# Patient Record
Sex: FEMALE | Race: WHITE | Employment: OTHER
[De-identification: names, ages, dates, MRNs, and addresses within clinical notes are randomized per-mention and may not be internally consistent; named-entity substitution may affect disease eponyms.]

---

## 2018-01-01 ENCOUNTER — HOME CARE VISIT (OUTPATIENT)
Dept: SCHEDULING | Facility: HOME HEALTH | Age: 78
End: 2018-01-01
Payer: MEDICARE

## 2018-01-01 ENCOUNTER — ANESTHESIA EVENT (OUTPATIENT)
Dept: SURGERY | Age: 78
DRG: 481 | End: 2018-01-01
Payer: MEDICARE

## 2018-01-01 ENCOUNTER — APPOINTMENT (OUTPATIENT)
Dept: GENERAL RADIOLOGY | Age: 78
DRG: 481 | End: 2018-01-01
Attending: EMERGENCY MEDICINE
Payer: MEDICARE

## 2018-01-01 ENCOUNTER — ANESTHESIA (OUTPATIENT)
Dept: SURGERY | Age: 78
DRG: 481 | End: 2018-01-01
Payer: MEDICARE

## 2018-01-01 ENCOUNTER — HOME HEALTH ADMISSION (OUTPATIENT)
Dept: HOME HEALTH SERVICES | Facility: HOME HEALTH | Age: 78
End: 2018-01-01
Payer: MEDICARE

## 2018-01-01 ENCOUNTER — HOSPITAL ENCOUNTER (INPATIENT)
Age: 78
LOS: 3 days | Discharge: NURSING, REHAB OR LONG-TERM CARE | DRG: 481 | End: 2018-06-07
Attending: EMERGENCY MEDICINE | Admitting: INTERNAL MEDICINE
Payer: MEDICARE

## 2018-01-01 ENCOUNTER — APPOINTMENT (OUTPATIENT)
Dept: GENERAL RADIOLOGY | Age: 78
DRG: 481 | End: 2018-01-01
Attending: ORTHOPAEDIC SURGERY
Payer: MEDICARE

## 2018-01-01 VITALS
TEMPERATURE: 98.2 F | OXYGEN SATURATION: 99 % | BODY MASS INDEX: 15.77 KG/M2 | RESPIRATION RATE: 18 BRPM | HEIGHT: 63 IN | DIASTOLIC BLOOD PRESSURE: 62 MMHG | SYSTOLIC BLOOD PRESSURE: 103 MMHG | WEIGHT: 89 LBS | HEART RATE: 73 BPM

## 2018-01-01 VITALS
TEMPERATURE: 98.4 F | SYSTOLIC BLOOD PRESSURE: 108 MMHG | RESPIRATION RATE: 18 BRPM | HEART RATE: 82 BPM | DIASTOLIC BLOOD PRESSURE: 64 MMHG

## 2018-01-01 VITALS
HEART RATE: 80 BPM | DIASTOLIC BLOOD PRESSURE: 80 MMHG | BODY MASS INDEX: 16.56 KG/M2 | WEIGHT: 90 LBS | TEMPERATURE: 98.9 F | HEIGHT: 62 IN | RESPIRATION RATE: 14 BRPM | SYSTOLIC BLOOD PRESSURE: 130 MMHG

## 2018-01-01 VITALS
DIASTOLIC BLOOD PRESSURE: 62 MMHG | TEMPERATURE: 97.5 F | SYSTOLIC BLOOD PRESSURE: 120 MMHG | HEART RATE: 66 BPM | RESPIRATION RATE: 18 BRPM

## 2018-01-01 DIAGNOSIS — G89.29 CHRONIC BILATERAL LOW BACK PAIN WITHOUT SCIATICA: ICD-10-CM

## 2018-01-01 DIAGNOSIS — S72.001A CLOSED FRACTURE OF RIGHT HIP, INITIAL ENCOUNTER (HCC): Primary | ICD-10-CM

## 2018-01-01 DIAGNOSIS — M54.50 CHRONIC BILATERAL LOW BACK PAIN WITHOUT SCIATICA: ICD-10-CM

## 2018-01-01 DIAGNOSIS — R60.0 LOCALIZED EDEMA: ICD-10-CM

## 2018-01-01 LAB
25(OH)D3+25(OH)D2 SERPL-MCNC: 16.8 NG/ML (ref 30–100)
ABO + RH BLD: NORMAL
ALBUMIN SERPL-MCNC: 2.6 G/DL (ref 3.2–4.6)
ALBUMIN/GLOB SERPL: 0.8 {RATIO} (ref 1.2–3.5)
ALP SERPL-CCNC: 50 U/L (ref 50–136)
ALT SERPL-CCNC: 12 U/L (ref 12–65)
ANION GAP SERPL CALC-SCNC: 7 MMOL/L (ref 7–16)
ANION GAP SERPL CALC-SCNC: 8 MMOL/L (ref 7–16)
APPEARANCE UR: CLEAR
APTT PPP: 27 SEC (ref 23.2–35.3)
AST SERPL-CCNC: 16 U/L (ref 15–37)
ATRIAL RATE: 99 BPM
BACTERIA SPEC CULT: NORMAL
BACTERIA URNS QL MICRO: 0 /HPF
BASOPHILS # BLD: 0 K/UL (ref 0–0.2)
BASOPHILS NFR BLD: 0 % (ref 0–2)
BILIRUB SERPL-MCNC: 0.5 MG/DL (ref 0.2–1.1)
BILIRUB UR QL: NEGATIVE
BLOOD GROUP ANTIBODIES SERPL: NORMAL
BUN SERPL-MCNC: 14 MG/DL (ref 8–23)
BUN SERPL-MCNC: 16 MG/DL (ref 8–23)
CALCIUM SERPL-MCNC: 8 MG/DL (ref 8.3–10.4)
CALCIUM SERPL-MCNC: 8.2 MG/DL (ref 8.3–10.4)
CALCIUM SERPL-MCNC: 8.5 MG/DL (ref 8.3–10.4)
CALCULATED R AXIS, ECG10: -69 DEGREES
CALCULATED T AXIS, ECG11: 99 DEGREES
CHLORIDE SERPL-SCNC: 113 MMOL/L (ref 98–107)
CHLORIDE SERPL-SCNC: 116 MMOL/L (ref 98–107)
CO2 SERPL-SCNC: 22 MMOL/L (ref 21–32)
CO2 SERPL-SCNC: 23 MMOL/L (ref 21–32)
COLOR UR: YELLOW
CREAT SERPL-MCNC: 0.88 MG/DL (ref 0.6–1)
CREAT SERPL-MCNC: 0.94 MG/DL (ref 0.6–1)
DIAGNOSIS, 93000: NORMAL
DIFFERENTIAL METHOD BLD: ABNORMAL
EOSINOPHIL # BLD: 0 K/UL (ref 0–0.8)
EOSINOPHIL # BLD: 0.1 K/UL (ref 0–0.8)
EOSINOPHIL # BLD: 0.3 K/UL (ref 0–0.8)
EOSINOPHIL NFR BLD: 0 % (ref 0.5–7.8)
EOSINOPHIL NFR BLD: 2 % (ref 0.5–7.8)
EOSINOPHIL NFR BLD: 5 % (ref 0.5–7.8)
EPI CELLS #/AREA URNS HPF: ABNORMAL /HPF
ERYTHROCYTE [DISTWIDTH] IN BLOOD BY AUTOMATED COUNT: 15.6 % (ref 11.9–14.6)
ERYTHROCYTE [DISTWIDTH] IN BLOOD BY AUTOMATED COUNT: 15.8 % (ref 11.9–14.6)
ERYTHROCYTE [DISTWIDTH] IN BLOOD BY AUTOMATED COUNT: 16 % (ref 11.9–14.6)
ERYTHROCYTE [DISTWIDTH] IN BLOOD BY AUTOMATED COUNT: 16 % (ref 11.9–14.6)
GLOBULIN SER CALC-MCNC: 3.3 G/DL (ref 2.3–3.5)
GLUCOSE SERPL-MCNC: 118 MG/DL (ref 65–100)
GLUCOSE SERPL-MCNC: 72 MG/DL (ref 65–100)
GLUCOSE UR STRIP.AUTO-MCNC: NEGATIVE MG/DL
HCT VFR BLD AUTO: 31 % (ref 35.8–46.3)
HCT VFR BLD AUTO: 32.8 % (ref 35.8–46.3)
HCT VFR BLD AUTO: 37.8 % (ref 35.8–46.3)
HCT VFR BLD AUTO: 38.2 % (ref 35.8–46.3)
HGB BLD-MCNC: 10.5 G/DL (ref 11.7–15.4)
HGB BLD-MCNC: 12.4 G/DL (ref 11.7–15.4)
HGB BLD-MCNC: 12.6 G/DL (ref 11.7–15.4)
HGB BLD-MCNC: 9.8 G/DL (ref 11.7–15.4)
HGB UR QL STRIP: NEGATIVE
IMM GRANULOCYTES # BLD: 0 K/UL (ref 0–0.5)
IMM GRANULOCYTES NFR BLD AUTO: 0 % (ref 0–5)
INR PPP: 1.1
INR PPP: 1.1
KETONES UR QL STRIP.AUTO: NEGATIVE MG/DL
LEUKOCYTE ESTERASE UR QL STRIP.AUTO: ABNORMAL
LYMPHOCYTES # BLD: 1.2 K/UL (ref 0.5–4.6)
LYMPHOCYTES # BLD: 1.3 K/UL (ref 0.5–4.6)
LYMPHOCYTES # BLD: 1.6 K/UL (ref 0.5–4.6)
LYMPHOCYTES NFR BLD: 17 % (ref 13–44)
LYMPHOCYTES NFR BLD: 18 % (ref 13–44)
LYMPHOCYTES NFR BLD: 20 % (ref 13–44)
MAGNESIUM SERPL-MCNC: 1.7 MG/DL (ref 1.8–2.4)
MCH RBC QN AUTO: 30.8 PG (ref 26.1–32.9)
MCH RBC QN AUTO: 31.3 PG (ref 26.1–32.9)
MCH RBC QN AUTO: 32 PG (ref 26.1–32.9)
MCH RBC QN AUTO: 32.4 PG (ref 26.1–32.9)
MCHC RBC AUTO-ENTMCNC: 31.6 G/DL (ref 31.4–35)
MCHC RBC AUTO-ENTMCNC: 32 G/DL (ref 31.4–35)
MCHC RBC AUTO-ENTMCNC: 32.5 G/DL (ref 31.4–35)
MCHC RBC AUTO-ENTMCNC: 33.3 G/DL (ref 31.4–35)
MCV RBC AUTO: 97.2 FL (ref 79.6–97.8)
MCV RBC AUTO: 97.5 FL (ref 79.6–97.8)
MCV RBC AUTO: 97.6 FL (ref 79.6–97.8)
MCV RBC AUTO: 98.5 FL (ref 79.6–97.8)
MM INDURATION POC: 0 MM (ref 0–5)
MM INDURATION POC: NORMAL 0 (ref 0–5)
MM INDURATION POC: NORMAL MM (ref 0–5)
MONOCYTES # BLD: 0.7 K/UL (ref 0.1–1.3)
MONOCYTES # BLD: 0.8 K/UL (ref 0.1–1.3)
MONOCYTES # BLD: 0.9 K/UL (ref 0.1–1.3)
MONOCYTES NFR BLD: 11 % (ref 4–12)
NEUTS SEG # BLD: 4.5 K/UL (ref 1.7–8.2)
NEUTS SEG # BLD: 5.3 K/UL (ref 1.7–8.2)
NEUTS SEG # BLD: 5.4 K/UL (ref 1.7–8.2)
NEUTS SEG NFR BLD: 66 % (ref 43–78)
NEUTS SEG NFR BLD: 69 % (ref 43–78)
NEUTS SEG NFR BLD: 70 % (ref 43–78)
NITRITE UR QL STRIP.AUTO: NEGATIVE
OTHER OBSERVATIONS,UCOM: ABNORMAL
PH UR STRIP: 6.5 [PH] (ref 5–9)
PLATELET # BLD AUTO: 122 K/UL (ref 150–450)
PLATELET # BLD AUTO: 130 K/UL (ref 150–450)
PLATELET # BLD AUTO: 131 K/UL (ref 150–450)
PLATELET # BLD AUTO: 138 K/UL (ref 150–450)
PMV BLD AUTO: 10 FL (ref 10.8–14.1)
PMV BLD AUTO: 10 FL (ref 10.8–14.1)
PMV BLD AUTO: 9.7 FL (ref 10.8–14.1)
PMV BLD AUTO: 9.9 FL (ref 10.8–14.1)
POTASSIUM SERPL-SCNC: 3.2 MMOL/L (ref 3.5–5.1)
POTASSIUM SERPL-SCNC: 4.1 MMOL/L (ref 3.5–5.1)
PPD POC: NORMAL NEGATIVE
PPD POC: NORMAL NEGATIVE
PREALB SERPL-MCNC: 11.8 MG/DL (ref 18–35.7)
PROT SERPL-MCNC: 5.9 G/DL (ref 6.3–8.2)
PROT UR STRIP-MCNC: 30 MG/DL
PROTHROMBIN TIME: 13.5 SEC (ref 11.5–14.5)
PROTHROMBIN TIME: 13.9 SEC (ref 11.5–14.5)
PTH-INTACT SERPL-MCNC: 32.5 PG/ML (ref 18.5–88)
Q-T INTERVAL, ECG07: 436 MS
QRS DURATION, ECG06: 184 MS
QTC CALCULATION (BEZET), ECG08: 497 MS
RBC # BLD AUTO: 3.18 M/UL (ref 4.05–5.25)
RBC # BLD AUTO: 3.36 M/UL (ref 4.05–5.25)
RBC # BLD AUTO: 3.88 M/UL (ref 4.05–5.25)
RBC # BLD AUTO: 3.89 M/UL (ref 4.05–5.25)
RBC #/AREA URNS HPF: ABNORMAL /HPF
SERVICE CMNT-IMP: NORMAL
SODIUM SERPL-SCNC: 143 MMOL/L (ref 136–145)
SODIUM SERPL-SCNC: 146 MMOL/L (ref 136–145)
SP GR UR REFRACTOMETRY: 1.02 (ref 1–1.02)
SPECIMEN EXP DATE BLD: NORMAL
UROBILINOGEN UR QL STRIP.AUTO: 1 EU/DL (ref 0.2–1)
VENTRICULAR RATE, ECG03: 78 BPM
WBC # BLD AUTO: 6.9 K/UL (ref 4.3–11.1)
WBC # BLD AUTO: 7.5 K/UL (ref 4.3–11.1)
WBC # BLD AUTO: 8 K/UL (ref 4.3–11.1)
WBC # BLD AUTO: 9.2 K/UL (ref 4.3–11.1)
WBC URNS QL MICRO: ABNORMAL /HPF

## 2018-01-01 PROCEDURE — G0151 HHCP-SERV OF PT,EA 15 MIN: HCPCS

## 2018-01-01 PROCEDURE — 74011250636 HC RX REV CODE- 250/636: Performed by: EMERGENCY MEDICINE

## 2018-01-01 PROCEDURE — 74011250637 HC RX REV CODE- 250/637: Performed by: INTERNAL MEDICINE

## 2018-01-01 PROCEDURE — 65270000029 HC RM PRIVATE

## 2018-01-01 PROCEDURE — 85610 PROTHROMBIN TIME: CPT | Performed by: EMERGENCY MEDICINE

## 2018-01-01 PROCEDURE — 97530 THERAPEUTIC ACTIVITIES: CPT

## 2018-01-01 PROCEDURE — 74011250637 HC RX REV CODE- 250/637: Performed by: NURSE PRACTITIONER

## 2018-01-01 PROCEDURE — 77030020255 HC SOL INJ LR 1000ML BG

## 2018-01-01 PROCEDURE — 83970 ASSAY OF PARATHORMONE: CPT | Performed by: NURSE PRACTITIONER

## 2018-01-01 PROCEDURE — 77030008467 HC STPLR SKN COVD -B: Performed by: ORTHOPAEDIC SURGERY

## 2018-01-01 PROCEDURE — 74011000302 HC RX REV CODE- 302: Performed by: INTERNAL MEDICINE

## 2018-01-01 PROCEDURE — 80048 BASIC METABOLIC PNL TOTAL CA: CPT | Performed by: INTERNAL MEDICINE

## 2018-01-01 PROCEDURE — 77030032490 HC SLV COMPR SCD KNE COVD -B

## 2018-01-01 PROCEDURE — 77030018836 HC SOL IRR NACL ICUM -A: Performed by: ORTHOPAEDIC SURGERY

## 2018-01-01 PROCEDURE — 73552 X-RAY EXAM OF FEMUR 2/>: CPT

## 2018-01-01 PROCEDURE — 36415 COLL VENOUS BLD VENIPUNCTURE: CPT | Performed by: INTERNAL MEDICINE

## 2018-01-01 PROCEDURE — 0QS636Z REPOSITION RIGHT UPPER FEMUR WITH INTRAMEDULLARY INTERNAL FIXATION DEVICE, PERCUTANEOUS APPROACH: ICD-10-PCS | Performed by: ORTHOPAEDIC SURGERY

## 2018-01-01 PROCEDURE — 86900 BLOOD TYPING SEROLOGIC ABO: CPT | Performed by: EMERGENCY MEDICINE

## 2018-01-01 PROCEDURE — 94762 N-INVAS EAR/PLS OXIMTRY CONT: CPT

## 2018-01-01 PROCEDURE — 0T9B70Z DRAINAGE OF BLADDER WITH DRAINAGE DEVICE, VIA NATURAL OR ARTIFICIAL OPENING: ICD-10-PCS | Performed by: EMERGENCY MEDICINE

## 2018-01-01 PROCEDURE — 74011250636 HC RX REV CODE- 250/636: Performed by: NURSE PRACTITIONER

## 2018-01-01 PROCEDURE — 74011250636 HC RX REV CODE- 250/636: Performed by: INTERNAL MEDICINE

## 2018-01-01 PROCEDURE — 51702 INSERT TEMP BLADDER CATH: CPT | Performed by: EMERGENCY MEDICINE

## 2018-01-01 PROCEDURE — 87641 MR-STAPH DNA AMP PROBE: CPT | Performed by: NURSE PRACTITIONER

## 2018-01-01 PROCEDURE — 77030036696 HC BOOT TRACT BUCKS S2SG -A

## 2018-01-01 PROCEDURE — 77030008477 HC STYL SATN SLP COVD -A: Performed by: REGISTERED NURSE

## 2018-01-01 PROCEDURE — 96374 THER/PROPH/DIAG INJ IV PUSH: CPT | Performed by: EMERGENCY MEDICINE

## 2018-01-01 PROCEDURE — 80053 COMPREHEN METABOLIC PANEL: CPT | Performed by: EMERGENCY MEDICINE

## 2018-01-01 PROCEDURE — C8929 TTE W OR WO FOL WCON,DOPPLER: HCPCS

## 2018-01-01 PROCEDURE — 76210000006 HC OR PH I REC 0.5 TO 1 HR: Performed by: ORTHOPAEDIC SURGERY

## 2018-01-01 PROCEDURE — 85730 THROMBOPLASTIN TIME PARTIAL: CPT | Performed by: EMERGENCY MEDICINE

## 2018-01-01 PROCEDURE — 85025 COMPLETE CBC W/AUTO DIFF WBC: CPT | Performed by: NURSE PRACTITIONER

## 2018-01-01 PROCEDURE — 74011250636 HC RX REV CODE- 250/636

## 2018-01-01 PROCEDURE — 71045 X-RAY EXAM CHEST 1 VIEW: CPT

## 2018-01-01 PROCEDURE — 85025 COMPLETE CBC W/AUTO DIFF WBC: CPT | Performed by: INTERNAL MEDICINE

## 2018-01-01 PROCEDURE — C1769 GUIDE WIRE: HCPCS | Performed by: ORTHOPAEDIC SURGERY

## 2018-01-01 PROCEDURE — 74011000250 HC RX REV CODE- 250

## 2018-01-01 PROCEDURE — 77030035168: Performed by: ORTHOPAEDIC SURGERY

## 2018-01-01 PROCEDURE — 77030002933 HC SUT MCRYL J&J -A: Performed by: ORTHOPAEDIC SURGERY

## 2018-01-01 PROCEDURE — 77030020263 HC SOL INJ SOD CL0.9% LFCR 1000ML

## 2018-01-01 PROCEDURE — 99285 EMERGENCY DEPT VISIT HI MDM: CPT | Performed by: EMERGENCY MEDICINE

## 2018-01-01 PROCEDURE — 97110 THERAPEUTIC EXERCISES: CPT

## 2018-01-01 PROCEDURE — 84134 ASSAY OF PREALBUMIN: CPT | Performed by: NURSE PRACTITIONER

## 2018-01-01 PROCEDURE — 85027 COMPLETE CBC AUTOMATED: CPT | Performed by: EMERGENCY MEDICINE

## 2018-01-01 PROCEDURE — 77030005515 HC CATH URETH FOL14 BARD -B

## 2018-01-01 PROCEDURE — C1713 ANCHOR/SCREW BN/BN,TIS/BN: HCPCS | Performed by: ORTHOPAEDIC SURGERY

## 2018-01-01 PROCEDURE — 400013 HH SOC

## 2018-01-01 PROCEDURE — G0152 HHCP-SERV OF OT,EA 15 MIN: HCPCS

## 2018-01-01 PROCEDURE — 36415 COLL VENOUS BLD VENIPUNCTURE: CPT | Performed by: NURSE PRACTITIONER

## 2018-01-01 PROCEDURE — 97162 PT EVAL MOD COMPLEX 30 MIN: CPT

## 2018-01-01 PROCEDURE — 97166 OT EVAL MOD COMPLEX 45 MIN: CPT

## 2018-01-01 PROCEDURE — 77030008703 HC TU ET UNCUF COVD -A: Performed by: REGISTERED NURSE

## 2018-01-01 PROCEDURE — 93005 ELECTROCARDIOGRAM TRACING: CPT | Performed by: EMERGENCY MEDICINE

## 2018-01-01 PROCEDURE — 77030020782 HC GWN BAIR PAWS FLX 3M -B: Performed by: ANESTHESIOLOGY

## 2018-01-01 PROCEDURE — 76060000033 HC ANESTHESIA 1 TO 1.5 HR: Performed by: ORTHOPAEDIC SURGERY

## 2018-01-01 PROCEDURE — 74011000258 HC RX REV CODE- 258: Performed by: NURSE PRACTITIONER

## 2018-01-01 PROCEDURE — 77030014405 HC GD ROD RMR SYNT -C: Performed by: ORTHOPAEDIC SURGERY

## 2018-01-01 PROCEDURE — 73110 X-RAY EXAM OF WRIST: CPT

## 2018-01-01 PROCEDURE — 77030033269 HC SLV COMPR SCD KNE2 CARD -B

## 2018-01-01 PROCEDURE — 74011250636 HC RX REV CODE- 250/636: Performed by: ANESTHESIOLOGY

## 2018-01-01 PROCEDURE — 97535 SELF CARE MNGMENT TRAINING: CPT

## 2018-01-01 PROCEDURE — 51702 INSERT TEMP BLADDER CATH: CPT

## 2018-01-01 PROCEDURE — 94762 N-INVAS EAR/PLS OXIMTRY CONT: CPT | Performed by: EMERGENCY MEDICINE

## 2018-01-01 PROCEDURE — 73502 X-RAY EXAM HIP UNI 2-3 VIEWS: CPT

## 2018-01-01 PROCEDURE — 77010033678 HC OXYGEN DAILY

## 2018-01-01 PROCEDURE — 81001 URINALYSIS AUTO W/SCOPE: CPT | Performed by: NURSE PRACTITIONER

## 2018-01-01 PROCEDURE — 82306 VITAMIN D 25 HYDROXY: CPT | Performed by: NURSE PRACTITIONER

## 2018-01-01 PROCEDURE — 77030011640 HC PAD GRND REM COVD -A: Performed by: ORTHOPAEDIC SURGERY

## 2018-01-01 PROCEDURE — 74011000250 HC RX REV CODE- 250: Performed by: INTERNAL MEDICINE

## 2018-01-01 PROCEDURE — 86580 TB INTRADERMAL TEST: CPT | Performed by: INTERNAL MEDICINE

## 2018-01-01 PROCEDURE — 76010000160 HC OR TIME 0.5 TO 1 HR INTENSV-TIER 1: Performed by: ORTHOPAEDIC SURGERY

## 2018-01-01 PROCEDURE — 83735 ASSAY OF MAGNESIUM: CPT | Performed by: INTERNAL MEDICINE

## 2018-01-01 DEVICE — NAIL IM L400MM DIA12MM 130DEG LNG R PROX FEM GRN TI CANN: Type: IMPLANTABLE DEVICE | Site: HIP | Status: FUNCTIONAL

## 2018-01-01 DEVICE — IMPLANTABLE DEVICE: Type: IMPLANTABLE DEVICE | Site: HIP | Status: FUNCTIONAL

## 2018-01-01 RX ORDER — NALOXONE HYDROCHLORIDE 0.4 MG/ML
0.2 INJECTION, SOLUTION INTRAMUSCULAR; INTRAVENOUS; SUBCUTANEOUS AS NEEDED
Status: DISCONTINUED | OUTPATIENT
Start: 2018-01-01 | End: 2018-01-01 | Stop reason: HOSPADM

## 2018-01-01 RX ORDER — CEFAZOLIN SODIUM/WATER 2 G/20 ML
2 SYRINGE (ML) INTRAVENOUS
Status: COMPLETED | OUTPATIENT
Start: 2018-01-01 | End: 2018-01-01

## 2018-01-01 RX ORDER — ENOXAPARIN SODIUM 100 MG/ML
30 INJECTION SUBCUTANEOUS EVERY 24 HOURS
Status: DISCONTINUED | OUTPATIENT
Start: 2018-01-01 | End: 2018-01-01 | Stop reason: HOSPADM

## 2018-01-01 RX ORDER — NEOSTIGMINE METHYLSULFATE 1 MG/ML
INJECTION INTRAVENOUS AS NEEDED
Status: DISCONTINUED | OUTPATIENT
Start: 2018-01-01 | End: 2018-01-01 | Stop reason: HOSPADM

## 2018-01-01 RX ORDER — SODIUM CHLORIDE, SODIUM LACTATE, POTASSIUM CHLORIDE, CALCIUM CHLORIDE 600; 310; 30; 20 MG/100ML; MG/100ML; MG/100ML; MG/100ML
75 INJECTION, SOLUTION INTRAVENOUS
Status: DISCONTINUED | OUTPATIENT
Start: 2018-01-01 | End: 2018-01-01

## 2018-01-01 RX ORDER — OXYCODONE HYDROCHLORIDE 5 MG/1
5 TABLET ORAL
Status: DISCONTINUED | OUTPATIENT
Start: 2018-01-01 | End: 2018-01-01 | Stop reason: SDUPTHER

## 2018-01-01 RX ORDER — GLYCOPYRROLATE 0.2 MG/ML
INJECTION INTRAMUSCULAR; INTRAVENOUS AS NEEDED
Status: DISCONTINUED | OUTPATIENT
Start: 2018-01-01 | End: 2018-01-01 | Stop reason: HOSPADM

## 2018-01-01 RX ORDER — FENTANYL CITRATE 50 UG/ML
100 INJECTION, SOLUTION INTRAMUSCULAR; INTRAVENOUS ONCE
Status: DISCONTINUED | OUTPATIENT
Start: 2018-01-01 | End: 2018-01-01 | Stop reason: HOSPADM

## 2018-01-01 RX ORDER — ACETAMINOPHEN 325 MG/1
650 TABLET ORAL EVERY 8 HOURS
Status: DISCONTINUED | OUTPATIENT
Start: 2018-01-01 | End: 2018-01-01 | Stop reason: HOSPADM

## 2018-01-01 RX ORDER — LIDOCAINE HYDROCHLORIDE 20 MG/ML
INJECTION, SOLUTION EPIDURAL; INFILTRATION; INTRACAUDAL; PERINEURAL AS NEEDED
Status: DISCONTINUED | OUTPATIENT
Start: 2018-01-01 | End: 2018-01-01 | Stop reason: HOSPADM

## 2018-01-01 RX ORDER — ONDANSETRON 2 MG/ML
4 INJECTION INTRAMUSCULAR; INTRAVENOUS
Status: DISCONTINUED | OUTPATIENT
Start: 2018-01-01 | End: 2018-01-01 | Stop reason: HOSPADM

## 2018-01-01 RX ORDER — FENTANYL CITRATE 50 UG/ML
INJECTION, SOLUTION INTRAMUSCULAR; INTRAVENOUS AS NEEDED
Status: DISCONTINUED | OUTPATIENT
Start: 2018-01-01 | End: 2018-01-01 | Stop reason: HOSPADM

## 2018-01-01 RX ORDER — SODIUM CHLORIDE, SODIUM LACTATE, POTASSIUM CHLORIDE, CALCIUM CHLORIDE 600; 310; 30; 20 MG/100ML; MG/100ML; MG/100ML; MG/100ML
75 INJECTION, SOLUTION INTRAVENOUS CONTINUOUS
Status: DISCONTINUED | OUTPATIENT
Start: 2018-01-01 | End: 2018-01-01 | Stop reason: HOSPADM

## 2018-01-01 RX ORDER — ACETAMINOPHEN 325 MG/1
650 TABLET ORAL EVERY 8 HOURS
Status: DISCONTINUED | OUTPATIENT
Start: 2018-01-01 | End: 2018-01-01 | Stop reason: SDUPTHER

## 2018-01-01 RX ORDER — OXYCODONE HYDROCHLORIDE 5 MG/1
5 TABLET ORAL
Qty: 15 TAB | Refills: 0 | Status: SHIPPED | OUTPATIENT
Start: 2018-01-01

## 2018-01-01 RX ORDER — SODIUM CHLORIDE 0.9 % (FLUSH) 0.9 %
5-10 SYRINGE (ML) INJECTION AS NEEDED
Status: DISCONTINUED | OUTPATIENT
Start: 2018-01-01 | End: 2018-01-01 | Stop reason: HOSPADM

## 2018-01-01 RX ORDER — CLOPIDOGREL BISULFATE 75 MG/1
75 TABLET ORAL DAILY
COMMUNITY

## 2018-01-01 RX ORDER — SODIUM CHLORIDE 9 MG/ML
100 INJECTION, SOLUTION INTRAVENOUS CONTINUOUS
Status: DISCONTINUED | OUTPATIENT
Start: 2018-01-01 | End: 2018-01-01 | Stop reason: HOSPADM

## 2018-01-01 RX ORDER — METOPROLOL SUCCINATE 25 MG/1
25 TABLET, EXTENDED RELEASE ORAL DAILY
COMMUNITY

## 2018-01-01 RX ORDER — OXYCODONE HYDROCHLORIDE 5 MG/1
5 TABLET ORAL
Status: DISCONTINUED | OUTPATIENT
Start: 2018-01-01 | End: 2018-01-01 | Stop reason: HOSPADM

## 2018-01-01 RX ORDER — MIDAZOLAM HYDROCHLORIDE 1 MG/ML
2 INJECTION, SOLUTION INTRAMUSCULAR; INTRAVENOUS ONCE
Status: DISCONTINUED | OUTPATIENT
Start: 2018-01-01 | End: 2018-01-01 | Stop reason: HOSPADM

## 2018-01-01 RX ORDER — LIDOCAINE HYDROCHLORIDE 10 MG/ML
0.1 INJECTION INFILTRATION; PERINEURAL AS NEEDED
Status: DISCONTINUED | OUTPATIENT
Start: 2018-01-01 | End: 2018-01-01 | Stop reason: HOSPADM

## 2018-01-01 RX ORDER — DOCUSATE SODIUM 100 MG/1
100 CAPSULE, LIQUID FILLED ORAL 2 TIMES DAILY
Status: DISCONTINUED | OUTPATIENT
Start: 2018-01-01 | End: 2018-01-01 | Stop reason: HOSPADM

## 2018-01-01 RX ORDER — ENOXAPARIN SODIUM 100 MG/ML
30 INJECTION SUBCUTANEOUS EVERY 24 HOURS
Qty: 8.7 ML | Refills: 0 | Status: SHIPPED
Start: 2018-01-01 | End: 2018-01-01

## 2018-01-01 RX ORDER — SODIUM CHLORIDE 9 MG/ML
2000 INJECTION, SOLUTION INTRAVENOUS CONTINUOUS
Status: DISCONTINUED | OUTPATIENT
Start: 2018-01-01 | End: 2018-01-01

## 2018-01-01 RX ORDER — HYDROMORPHONE HYDROCHLORIDE 1 MG/ML
0.5 INJECTION, SOLUTION INTRAMUSCULAR; INTRAVENOUS; SUBCUTANEOUS
Status: DISCONTINUED | OUTPATIENT
Start: 2018-01-01 | End: 2018-01-01

## 2018-01-01 RX ORDER — HYDROMORPHONE HYDROCHLORIDE 2 MG/ML
0.5 INJECTION, SOLUTION INTRAMUSCULAR; INTRAVENOUS; SUBCUTANEOUS
Status: DISCONTINUED | OUTPATIENT
Start: 2018-01-01 | End: 2018-01-01 | Stop reason: HOSPADM

## 2018-01-01 RX ORDER — FENTANYL 75 UG/H
1 PATCH TRANSDERMAL
Status: DISCONTINUED | OUTPATIENT
Start: 2018-01-01 | End: 2018-01-01 | Stop reason: HOSPADM

## 2018-01-01 RX ORDER — MAG HYDROX/ALUMINUM HYD/SIMETH 200-200-20
30 SUSPENSION, ORAL (FINAL DOSE FORM) ORAL
Status: DISCONTINUED | OUTPATIENT
Start: 2018-01-01 | End: 2018-01-01 | Stop reason: HOSPADM

## 2018-01-01 RX ORDER — ROCURONIUM BROMIDE 10 MG/ML
INJECTION, SOLUTION INTRAVENOUS AS NEEDED
Status: DISCONTINUED | OUTPATIENT
Start: 2018-01-01 | End: 2018-01-01 | Stop reason: HOSPADM

## 2018-01-01 RX ORDER — SODIUM CHLORIDE 0.9 % (FLUSH) 0.9 %
5-10 SYRINGE (ML) INJECTION EVERY 8 HOURS
Status: DISCONTINUED | OUTPATIENT
Start: 2018-01-01 | End: 2018-01-01 | Stop reason: HOSPADM

## 2018-01-01 RX ORDER — GUAIFENESIN 600 MG/1
600 TABLET, EXTENDED RELEASE ORAL AS NEEDED
COMMUNITY
End: 2018-01-01

## 2018-01-01 RX ORDER — POTASSIUM CHLORIDE 20 MEQ/1
40 TABLET, EXTENDED RELEASE ORAL
Status: COMPLETED | OUTPATIENT
Start: 2018-01-01 | End: 2018-01-01

## 2018-01-01 RX ORDER — TRAMADOL HYDROCHLORIDE 50 MG/1
50 TABLET ORAL
Status: DISCONTINUED | OUTPATIENT
Start: 2018-01-01 | End: 2018-01-01 | Stop reason: HOSPADM

## 2018-01-01 RX ORDER — IPRATROPIUM BROMIDE AND ALBUTEROL SULFATE 2.5; .5 MG/3ML; MG/3ML
3 SOLUTION RESPIRATORY (INHALATION)
Status: DISCONTINUED | OUTPATIENT
Start: 2018-01-01 | End: 2018-01-01 | Stop reason: HOSPADM

## 2018-01-01 RX ORDER — FENTANYL 75 UG/H
1 PATCH TRANSDERMAL
Qty: 10 PATCH | Refills: 0 | Status: SHIPPED | OUTPATIENT
Start: 2018-01-01

## 2018-01-01 RX ORDER — PANTOPRAZOLE SODIUM 40 MG/1
40 TABLET, DELAYED RELEASE ORAL
Status: DISCONTINUED | OUTPATIENT
Start: 2018-01-01 | End: 2018-01-01 | Stop reason: HOSPADM

## 2018-01-01 RX ORDER — FERROUS SULFATE, DRIED 160(50) MG
1 TABLET, EXTENDED RELEASE ORAL
Qty: 90 TAB | Refills: 11 | Status: SHIPPED
Start: 2018-01-01

## 2018-01-01 RX ORDER — SODIUM CHLORIDE, SODIUM LACTATE, POTASSIUM CHLORIDE, CALCIUM CHLORIDE 600; 310; 30; 20 MG/100ML; MG/100ML; MG/100ML; MG/100ML
75 INJECTION, SOLUTION INTRAVENOUS
Status: COMPLETED | OUTPATIENT
Start: 2018-01-01 | End: 2018-01-01

## 2018-01-01 RX ORDER — ETOMIDATE 2 MG/ML
INJECTION INTRAVENOUS AS NEEDED
Status: DISCONTINUED | OUTPATIENT
Start: 2018-01-01 | End: 2018-01-01 | Stop reason: HOSPADM

## 2018-01-01 RX ORDER — MAGNESIUM SULFATE HEPTAHYDRATE 40 MG/ML
2 INJECTION, SOLUTION INTRAVENOUS ONCE
Status: COMPLETED | OUTPATIENT
Start: 2018-01-01 | End: 2018-01-01

## 2018-01-01 RX ORDER — FERROUS SULFATE, DRIED 160(50) MG
1 TABLET, EXTENDED RELEASE ORAL
Status: DISCONTINUED | OUTPATIENT
Start: 2018-01-01 | End: 2018-01-01 | Stop reason: HOSPADM

## 2018-01-01 RX ORDER — MIDAZOLAM HYDROCHLORIDE 1 MG/ML
2 INJECTION, SOLUTION INTRAMUSCULAR; INTRAVENOUS
Status: DISCONTINUED | OUTPATIENT
Start: 2018-01-01 | End: 2018-01-01 | Stop reason: HOSPADM

## 2018-01-01 RX ORDER — FENTANYL CITRATE 50 UG/ML
50 INJECTION, SOLUTION INTRAMUSCULAR; INTRAVENOUS ONCE
Status: COMPLETED | OUTPATIENT
Start: 2018-01-01 | End: 2018-01-01

## 2018-01-01 RX ADMIN — OXYCODONE HYDROCHLORIDE 5 MG: 5 TABLET ORAL at 21:32

## 2018-01-01 RX ADMIN — CEFAZOLIN SODIUM 1 G: 1 INJECTION, POWDER, FOR SOLUTION INTRAMUSCULAR; INTRAVENOUS at 21:06

## 2018-01-01 RX ADMIN — ACETAMINOPHEN 650 MG: 325 TABLET ORAL at 17:30

## 2018-01-01 RX ADMIN — SODIUM CHLORIDE 100 ML/HR: 900 INJECTION, SOLUTION INTRAVENOUS at 18:00

## 2018-01-01 RX ADMIN — SODIUM CHLORIDE, SODIUM LACTATE, POTASSIUM CHLORIDE, AND CALCIUM CHLORIDE: 600; 310; 30; 20 INJECTION, SOLUTION INTRAVENOUS at 13:13

## 2018-01-01 RX ADMIN — PERFLUTREN 1 ML: 6.52 INJECTION, SUSPENSION INTRAVENOUS at 10:00

## 2018-01-01 RX ADMIN — OXYCODONE HYDROCHLORIDE 5 MG: 5 TABLET ORAL at 21:08

## 2018-01-01 RX ADMIN — OXYCODONE HYDROCHLORIDE 5 MG: 5 TABLET ORAL at 01:05

## 2018-01-01 RX ADMIN — OXYCODONE HYDROCHLORIDE 5 MG: 5 TABLET ORAL at 13:13

## 2018-01-01 RX ADMIN — ACETAMINOPHEN 650 MG: 325 TABLET ORAL at 16:59

## 2018-01-01 RX ADMIN — OYSTER SHELL CALCIUM WITH VITAMIN D 1 TABLET: 500; 200 TABLET, FILM COATED ORAL at 13:13

## 2018-01-01 RX ADMIN — ACETAMINOPHEN 650 MG: 325 TABLET ORAL at 05:29

## 2018-01-01 RX ADMIN — HYDROMORPHONE HYDROCHLORIDE 0.5 MG: 1 INJECTION, SOLUTION INTRAMUSCULAR; INTRAVENOUS; SUBCUTANEOUS at 16:59

## 2018-01-01 RX ADMIN — OXYCODONE HYDROCHLORIDE 5 MG: 5 TABLET ORAL at 04:12

## 2018-01-01 RX ADMIN — ACETAMINOPHEN 650 MG: 325 TABLET ORAL at 13:13

## 2018-01-01 RX ADMIN — DOCUSATE SODIUM 100 MG: 100 CAPSULE, LIQUID FILLED ORAL at 08:57

## 2018-01-01 RX ADMIN — NEOSTIGMINE METHYLSULFATE 3 MG: 1 INJECTION INTRAVENOUS at 13:56

## 2018-01-01 RX ADMIN — ACETAMINOPHEN 650 MG: 325 TABLET ORAL at 21:08

## 2018-01-01 RX ADMIN — OXYCODONE HYDROCHLORIDE 5 MG: 5 TABLET ORAL at 05:29

## 2018-01-01 RX ADMIN — OXYCODONE HYDROCHLORIDE 5 MG: 5 TABLET ORAL at 15:17

## 2018-01-01 RX ADMIN — SODIUM CHLORIDE, SODIUM LACTATE, POTASSIUM CHLORIDE, AND CALCIUM CHLORIDE 75 ML/HR: 600; 310; 30; 20 INJECTION, SOLUTION INTRAVENOUS at 11:45

## 2018-01-01 RX ADMIN — SODIUM CHLORIDE 2000 ML: 900 INJECTION, SOLUTION INTRAVENOUS at 15:32

## 2018-01-01 RX ADMIN — PANTOPRAZOLE SODIUM 40 MG: 40 TABLET, DELAYED RELEASE ORAL at 05:28

## 2018-01-01 RX ADMIN — Medication 10 ML: at 05:29

## 2018-01-01 RX ADMIN — GLYCOPYRROLATE 0.4 MG: 0.2 INJECTION INTRAMUSCULAR; INTRAVENOUS at 13:56

## 2018-01-01 RX ADMIN — SODIUM CHLORIDE, SODIUM LACTATE, POTASSIUM CHLORIDE, AND CALCIUM CHLORIDE 75 ML/HR: 600; 310; 30; 20 INJECTION, SOLUTION INTRAVENOUS at 15:18

## 2018-01-01 RX ADMIN — Medication 2 G: at 13:34

## 2018-01-01 RX ADMIN — ACETAMINOPHEN 650 MG: 325 TABLET ORAL at 05:33

## 2018-01-01 RX ADMIN — ROCURONIUM BROMIDE 35 MG: 10 INJECTION, SOLUTION INTRAVENOUS at 13:21

## 2018-01-01 RX ADMIN — LIDOCAINE HYDROCHLORIDE 60 MG: 20 INJECTION, SOLUTION EPIDURAL; INFILTRATION; INTRACAUDAL; PERINEURAL at 13:21

## 2018-01-01 RX ADMIN — DOCUSATE SODIUM 100 MG: 100 CAPSULE, LIQUID FILLED ORAL at 18:11

## 2018-01-01 RX ADMIN — DOCUSATE SODIUM 100 MG: 100 CAPSULE, LIQUID FILLED ORAL at 08:02

## 2018-01-01 RX ADMIN — Medication 10 ML: at 15:27

## 2018-01-01 RX ADMIN — MAGNESIUM SULFATE HEPTAHYDRATE 2 G: 40 INJECTION, SOLUTION INTRAVENOUS at 12:05

## 2018-01-01 RX ADMIN — ENOXAPARIN SODIUM 30 MG: 30 INJECTION SUBCUTANEOUS at 13:13

## 2018-01-01 RX ADMIN — PANTOPRAZOLE SODIUM 40 MG: 40 TABLET, DELAYED RELEASE ORAL at 05:29

## 2018-01-01 RX ADMIN — ACETAMINOPHEN 650 MG: 325 TABLET ORAL at 20:14

## 2018-01-01 RX ADMIN — CEFAZOLIN SODIUM 1 G: 1 INJECTION, POWDER, FOR SOLUTION INTRAMUSCULAR; INTRAVENOUS at 05:34

## 2018-01-01 RX ADMIN — PANTOPRAZOLE SODIUM 40 MG: 40 TABLET, DELAYED RELEASE ORAL at 05:33

## 2018-01-01 RX ADMIN — TUBERCULIN PURIFIED PROTEIN DERIVATIVE 5 UNITS: 5 INJECTION, SOLUTION INTRADERMAL at 17:00

## 2018-01-01 RX ADMIN — POTASSIUM CHLORIDE 40 MEQ: 1500 TABLET, EXTENDED RELEASE ORAL at 16:59

## 2018-01-01 RX ADMIN — OYSTER SHELL CALCIUM WITH VITAMIN D 1 TABLET: 500; 200 TABLET, FILM COATED ORAL at 18:11

## 2018-01-01 RX ADMIN — Medication 10 ML: at 21:32

## 2018-01-01 RX ADMIN — OXYCODONE HYDROCHLORIDE 5 MG: 5 TABLET ORAL at 05:33

## 2018-01-01 RX ADMIN — Medication 10 ML: at 21:10

## 2018-01-01 RX ADMIN — OXYCODONE HYDROCHLORIDE 5 MG: 5 TABLET ORAL at 20:13

## 2018-01-01 RX ADMIN — ETOMIDATE 16 MG: 2 INJECTION INTRAVENOUS at 13:21

## 2018-01-01 RX ADMIN — ACETAMINOPHEN 650 MG: 325 TABLET ORAL at 05:28

## 2018-01-01 RX ADMIN — TRAMADOL HYDROCHLORIDE 50 MG: 50 TABLET, FILM COATED ORAL at 17:30

## 2018-01-01 RX ADMIN — Medication 10 ML: at 13:15

## 2018-01-01 RX ADMIN — OYSTER SHELL CALCIUM WITH VITAMIN D 1 TABLET: 500; 200 TABLET, FILM COATED ORAL at 07:59

## 2018-01-01 RX ADMIN — FENTANYL CITRATE 100 MCG: 50 INJECTION, SOLUTION INTRAMUSCULAR; INTRAVENOUS at 13:21

## 2018-01-01 RX ADMIN — OYSTER SHELL CALCIUM WITH VITAMIN D 1 TABLET: 500; 200 TABLET, FILM COATED ORAL at 08:57

## 2018-01-01 RX ADMIN — FENTANYL CITRATE 50 MCG: 50 INJECTION INTRAMUSCULAR; INTRAVENOUS at 13:40

## 2018-01-01 RX ADMIN — OYSTER SHELL CALCIUM WITH VITAMIN D 1 TABLET: 500; 200 TABLET, FILM COATED ORAL at 17:30

## 2018-01-01 RX ADMIN — Medication 10 ML: at 18:00

## 2018-01-01 RX ADMIN — ACETAMINOPHEN 650 MG: 325 TABLET ORAL at 21:32

## 2018-01-01 RX ADMIN — Medication 10 ML: at 20:16

## 2018-01-01 RX ADMIN — Medication 10 ML: at 05:37

## 2018-06-04 PROBLEM — I51.9 SYSTOLIC DYSFUNCTION: Chronic | Status: ACTIVE | Noted: 2018-01-01

## 2018-06-04 PROBLEM — G89.29 CHRONIC PAIN: Chronic | Status: ACTIVE | Noted: 2018-01-01

## 2018-06-04 PROBLEM — I51.89 DIASTOLIC DYSFUNCTION: Chronic | Status: ACTIVE | Noted: 2018-01-01

## 2018-06-04 PROBLEM — S72.009A HIP FRACTURE (HCC): Status: ACTIVE | Noted: 2018-01-01

## 2018-06-04 PROBLEM — E87.6 HYPOKALEMIA: Status: ACTIVE | Noted: 2018-01-01

## 2018-06-04 PROBLEM — D69.6 THROMBOCYTOPENIA (HCC): Status: ACTIVE | Noted: 2018-01-01

## 2018-06-04 NOTE — ED NOTES
TRANSFER - OUT REPORT:    Verbal report given to NGUYEN Marie on Yao Rayral  being transferred to room 726 for routine progression of care       Report consisted of patients Situation, Background, Assessment and   Recommendations(SBAR). Information from the following report(s) ED Summary was reviewed with the receiving nurse. Opportunity for questions and clarification was provided.       Patient transported with:   Tech   O2 at 2 liters

## 2018-06-04 NOTE — ANESTHESIA PREPROCEDURE EVALUATION
Anesthetic History     PONV          Review of Systems / Medical History  Patient summary reviewed and pertinent labs reviewed    Pulmonary    COPD (home O2 prn): moderate      Smoker         Neuro/Psych   Within defined limits           Cardiovascular    Hypertension  Valvular problems/murmurs (Mod AS (CATARIAN 1.2 cm2), severe TR, severe MR, mod MS): tricuspid insufficiency, mitral stenosis, mitral insufficiency and aortic stenosis    CHF (EF 40%)  Dysrhythmias : atrial fibrillation  Pacemaker (pacer for complete heart block), CAD, PAD (5.1 cm AAA 2015) and cardiac stents (8 stents, last 2008)    Exercise tolerance: <4 METS  Comments: Pt states she was told her AAA and PAD were inoperable so she has not had f/u since 2015. ECHO: Severe MR; CATARINA 1.1 cm2; EF 40-45%  PER CARDIOLOGY: HIGH RISK     GI/Hepatic/Renal     GERD: well controlled    Renal disease: CRI      Comments: GI bleed Endo/Other        Arthritis     Other Findings   Comments: Mild thrombocytopenia         Physical Exam    Airway  Mallampati: II  TM Distance: 4 - 6 cm  Neck ROM: normal range of motion   Mouth opening: Normal     Cardiovascular    Rhythm: regular  Rate: normal    Murmur: Grade 3, Aortic area     Dental    Dentition: Full lower dentures and Full upper dentures     Pulmonary  Breath sounds clear to auscultation               Abdominal  GI exam deferred       Other Findings            Anesthetic Plan    ASA: 4  Anesthesia type: general          Induction: Intravenous  Anesthetic plan and risks discussed with: Patient    Granddaughter    Last plavix 6/3 per pt. Plan for GETA. I spent greater than 10 min discussing the risks with the family and pt. She is high risk.

## 2018-06-04 NOTE — PROGRESS NOTES
06/04/18 1927   Dual Skin Pressure Injury Assessment   Dual Skin Pressure Injury Assessment WDL   Second Care Provider (Based on [address] [place] [address]) Rick Walsh RN

## 2018-06-04 NOTE — H&P
Hospitalist H&P Note     Admit Date:  2018 12:55 PM   Name:  Saul Gonzalez   Age:  69 y.o.  :  1940   MRN:  [id number]   PCP:  David Jensen MD  Treatment Team: Attending Provider: Sherly Fajardo MD; Primary Nurse: Clementina Gillespie, RN; Primary Nurse: Rik Peabody, RN    HPI:     CC: right hip pain      Ms. Sheryl Van is a 70 yo female with PMH of ongoing tobacco use, COPD with O2 prn,  third degree AV block with pacer, moderate AS, systolic and diastolic CHF (EF 15% 9833), chronic pain on fentanyl patch, who is s/p fall with right hip pain and found to have right proximal right hip fracture. She has not been compliant with cardiology followup. She denies chest pain and is not very ambulatory to determine if she has dyspnea. She denies troubles with anesthesia. 10 systems reviewed and negative except as noted in HPI. - has back pain, has dry skin, has paresthesia to feet, has weight loss        Past Medical History:   Diagnosis Date    AAA (abdominal aortic aneurysm) without rupture ([address] 2015    Abdominal aneurysm without mention of rupture     Acute kidney failure (HCC)     Aneurysm ([address]     AAA    Aortic stenosis 2015    Arrhythmia     heart block    Arthritis     Atherosclerosis of other specified arteries     AV block, 3rd degree ([address] 2014    CAD (coronary artery disease)     8 stents    Candidiasis of mouth     Candidiasis of the esophagus (HCC)     Cervicalgia     Chest pain     Chronic obstructive pulmonary disease (HCC)     Chronic pain     back    Coagulation disorder (HCC)     GI Bleed    [facility]) [facility]     Coronary atherosclerosis of native coronary artery 2015    Coronary atherosclerosis of native coronary vessel 2016    Dehydration 2015    Diarrhea 2015    Edema     Generalized and unspecified atherosclerosis     GERD (gastroesophageal reflux disease)     Heart failure ([address]     Hyperlipidemia     other unsp dyslipidemia    Hypertension, essential, benign     IFG (impaired fasting glucose)     Ischemic cardiomyopathy     Lower extremity edema 8/21/2015    Lumbago     Mitral valve regurgitation     Mixed hyperlipidemia 6/16/2015    N&V (nausea and vomiting) 8/20/2015    Osteoarthrosis, unspecified whether generalized or localized, unspecified site     Other malaise and fatigue     PUD (peptic ulcer disease)     SOB (shortness of breath)     Third degree heart block ([address] 1/7/2016    Tobacco use disorder     Tobacco use disorder       Past Surgical History:   Procedure Laterality Date    CARDIAC SURG PROCEDURE UNLIST      PCIs ; stents x8    HX HYSTERECTOMY      partial    HX ORTHOPAEDIC      back surgeries x 2    HX PACEMAKER  7/30/2014    [facility] [facility] pacer      Allergies   Allergen Reactions    Celebrex [Celecoxib] Nausea and Vomiting    Codeine Nausea and Vomiting    Morphine Other (comments)     Hallucinations    Pravastatin Other (comments)     Mouth sores       Social History   Substance Use Topics    Smoking status: Former Smoker     Packs/day: 0.50     Years: 25.00     Quit date: 10/28/2012    Smokeless tobacco: Never Used      Comment: around 2nd hand smoke    Alcohol use No      Family History   Problem Relation Age of Onset    Heart Disease Mother     Diabetes Mother     Heart Disease Father     Diabetes Father     Heart Disease Sister     Diabetes Sister     Diabetes Sister     Heart Disease Sister       Immunization History   Administered Date(s) Administered    TB Skin Test (PPD) Intradermal 08/21/2015     PTA Medications:  Prior to Admission Medications   Prescriptions Last Dose Informant Patient Reported? Taking? albuterol-ipratropium (DUO-NEB) 2.5 mg-0.5 mg/3 ml nebulizer solution   Yes No   Sig: 3 mL by Nebulization route once.   ezetimibe (ZETIA) 10 mg tablet   Yes No   Sig: Take 10 mg by mouth daily.    ezetimibe (ZETIA) 10 mg tablet   No No   Sig: Take 1 Tab by mouth nightly. fentaNYL (DURAGESIC) 75 mcg/hr   No No   Si Patch by TransDERmal route every seventy-two (72) hours. Max Daily Amount: 1 Patch. furosemide (LASIX) 20 mg tablet   No No   Sig: Take 1 Tab by mouth daily. Indications: EDEMA   pantoprazole (PROTONIX) 40 mg tablet   No No   Sig: Take 1 Tab by mouth daily. spironolactone (ALDACTONE) 25 mg tablet   No No   Sig: Take 1 Tab by mouth daily. traMADol (ULTRAM) 50 mg tablet   No No   Sig: Take 1 Tab by mouth every six (6) hours as needed for Pain. Max Daily Amount: 200 mg. Indications: PAIN      Facility-Administered Medications: None       Objective:   Patient Vitals for the past 24 hrs:   Temp Pulse Resp BP SpO2   18 1340 - 75 18 133/75 97 %   18 1320 - 75 18 131/70 96 %   18 1301 - 82 - 135/74 97 %   18 1255 - - - - 94 %   18 1247 98.5 °F (36.9 °C) 80 18 133/74 93 %     Oxygen Therapy  O2 Sat (%): 97 % (18 1340)  Pulse via Oximetry: 76 beats per minute (18 1340)  O2 Device: Nasal cannula (18 1255)  O2 Flow Rate (L/min): 1 l/min (18 1255)  No intake or output data in the 24 hours ending 18 1541    Physical Exam:  General:    Alert. No distress, elderly,   Eyes:   Normal sclera. Extraocular movements intact. PERRLA  ENT:  Normocephalic, atraumatic. Moist mucous membranes  CV:   RRR. No m/r/g. No edema  Lungs:  CTAB. No wheezing, rhonchi, or rales. Anterior exam   Abdomen: Soft, nontender, nondistended. Present BS  Extremities: Warm and dry. .  Neurologic:  grossly intact. Skin:     No rashes or jaundice. Normal coloration  Psych:  Normal mood and affect. I reviewed the labs, imaging, EKGs, telemetry, and other studies done this admission.     EKG personally reviewed as paced       Data Review:   Recent Results (from the past 24 hour(s))   EKG, 12 LEAD, INITIAL    Collection Time: 18 12:57 PM   Result Value Ref Range    Ventricular Rate 78 BPM    Atrial Rate 99 BPM    QRS Duration 184 ms    Q-T Interval 436 ms    QTC Calculation (Bezet) 497 ms    Calculated R Axis -69 degrees    Calculated T Axis 99 degrees    Diagnosis       !! AGE AND GENDER SPECIFIC ECG ANALYSIS !! Atrial fibrillation  Left axis deviation  Right bundle branch block  Septal infarct , age undetermined  Possible Lateral infarct , age undetermined  Abnormal ECG  When compared with ECG of 20-AUG-2015 10:35,  Atrial fibrillation has replaced Electronic ventricular pacemaker     TYPE & SCREEN    Collection Time: 06/04/18 12:58 PM   Result Value Ref Range    Crossmatch Expiration 06/07/2018     ABO/Rh(D) A POSITIVE     Antibody screen NEG    CBC W/O DIFF    Collection Time: 06/04/18  1:03 PM   Result Value Ref Range    WBC 9.2 4.3 - 11.1 K/uL    RBC 3.89 (L) 4.05 - 5.25 M/uL    HGB 12.6 11.7 - 15.4 g/dL    HCT 37.8 35.8 - 46.3 %    MCV 97.2 79.6 - 97.8 FL    MCH 32.4 26.1 - 32.9 PG    MCHC 33.3 31.4 - 35.0 g/dL    RDW 15.6 (H) 11.9 - 14.6 %    PLATELET 539 (L) 082 - 450 K/uL    MPV 9.9 (L) 10.8 - 33.3 FL   METABOLIC PANEL, COMPREHENSIVE    Collection Time: 06/04/18  1:03 PM   Result Value Ref Range    Sodium 143 136 - 145 mmol/L    Potassium 3.2 (L) 3.5 - 5.1 mmol/L    Chloride 113 (H) 98 - 107 mmol/L    CO2 23 21 - 32 mmol/L    Anion gap 7 7 - 16 mmol/L    Glucose 118 (H) 65 - 100 mg/dL    BUN 16 8 - 23 MG/DL    Creatinine 0.94 0.6 - 1.0 MG/DL    GFR est AA >60 >60 ml/min/1.73m2    GFR est non-AA >60 >60 ml/min/1.73m2    Calcium 8.2 (L) 8.3 - 10.4 MG/DL    Bilirubin, total 0.5 0.2 - 1.1 MG/DL    ALT (SGPT) 12 12 - 65 U/L    AST (SGOT) 16 15 - 37 U/L    Alk.  phosphatase 50 50 - 136 U/L    Protein, total 5.9 (L) 6.3 - 8.2 g/dL    Albumin 2.6 (L) 3.2 - 4.6 g/dL    Globulin 3.3 2.3 - 3.5 g/dL    A-G Ratio 0.8 (L) 1.2 - 3.5     PROTHROMBIN TIME + INR    Collection Time: 06/04/18  1:03 PM   Result Value Ref Range    Prothrombin time 13.5 11.5 - 14.5 sec    INR 1.1     PTT    Collection Time: 06/04/18  1:03 PM   Result Value Ref Range    aPTT 27.0 23.2 - 35.3 SEC       All Micro Results     Procedure Component Value Units Date/Time    MSSA/MRSA SC BY PCR, NASAL SWAB [[id number]]     Order Status:  Sent Specimen:  Swab           Other Studies:  Xr Chest Sngl V    Result Date: 6/4/2018  Single portable supine chest x-ray June 4, 2018 Reference exam: August 20, 2015 INDICATION: Mitzi Plump yesterday FINDINGS: Cardiac silhouette is enlarged with left-sided electronic device present, pulmonary vascularity is considered normal with some minimal stranding again noted that may be chronic fibrotic changes. Orthopedic hardware is seen along the upper thoracic spine. IMPRESSION: Chronic appearing changes without evidence of active failure. Xr Wrist Rt Ap/lat/obl Min 3v    Result Date: 6/4/2018  Three-view right wrist x-ray June 4, 2018 Reference exam: None INDICATION: Mitzi Plump yesterday FINDINGS: 3 images are presented, diffuse demineralization is seen, there is questionable periosteal elevation of the proximal fourth metacarpal but no bony injury otherwise identified. IMPRESSION: Demineralization, very questionable injury to the proximal fourth metacarpal. If there is point tenderness here, follow-up in 7-10 days should be considered. Xr Hip Rt W Or Wo Pelv 2-3 Vws    Result Date: 6/4/2018  Two-view right hip x-ray June 4, 2018 Reference exam: None INDICATION: Mitzi Plump yesterday with hip pain FINDINGS: 3 images are presented, large amount of fecal material obscures the sacrum, there is a fracture seen with impaction and rotation at appears to be an intertrochanteric fracture on the right with irregularity that could be a inferior pubic ramus fracture on the right but difficult to tell due to positioning. IMPRESSION: Fracture right femur with questionable right inferior pubic ramus fracture.     Xr Femur Rt 2 Vs    Result Date: 6/4/2018  Two-view right femur x-ray June 4, 2018 Reference exam: None INDICATION: Mitzi Plump today pain FINDINGS: 4 images are presented, vascular calcifications are seen. There is apparent intertrochanteric fracture of the femur with avulsion of the lesser trochanter. There is no dislocation seen. IMPRESSION: Proximal femoral fracture.       Assessment and Plan:     Hospital Problems as of 6/4/2018  Date Reviewed: 2/23/2016          Codes Class Noted - Resolved POA    * (Principal)Hip fracture ([facility] ICD-10-CM: W02.503L  ICD-9-CM: 820.8  6/4/2018 - Present Yes        Systolic dysfunction (Chronic) ICD-10-CM: I51.9  ICD-9-CM: 429.9  6/4/2018 - Present Yes        Diastolic dysfunction (Chronic) ICD-10-CM: I51.9  ICD-9-CM: 429.9  6/4/2018 - Present Yes        Chronic pain (Chronic) ICD-10-CM: G89.29  ICD-9-CM: 338.29  6/4/2018 - Present Yes        Hypokalemia ICD-10-CM: E87.6  ICD-9-CM: 276.8  6/4/2018 - Present Yes        Thrombocytopenia ([facility] ICD-10-CM: D69.6  ICD-9-CM: 287.5  6/4/2018 - Present Yes        Coronary atherosclerosis of native coronary vessel ICD-10-CM: I25.10  ICD-9-CM: 414.01  1/7/2016 - Present Yes        COPD (chronic obstructive pulmonary disease) ([facility] ICD-10-CM: J44.9  ICD-9-CM: 496  Unknown - Present Yes        AAA (abdominal aortic aneurysm) without rupture (HCC) (Chronic) ICD-10-CM: I71.4  ICD-9-CM: 441.4  8/24/2015 - Present Yes    Overview Signed 10/31/2016  3:21 PM by Zollie Severin     surg noted reviewed, high surg risk, PVD limits stent graft options             Aortic stenosis ICD-10-CM: I35.0  ICD-9-CM: 424.1 Acute 8/23/2015 - Present Yes              · Right hip fracture: preop order set completed, additionally has questionable right pubic rami fracture, NPO at midnight, ortho consulted and discussed case with Jazmin Ibarra RN of orthopedics and will need cardiology consult preop due to her complicated cardiac history   · Cardiomyopathy: check ECHO, cardiology to consult, caution with IVF  · Aortic stenosis: per cardiology  · CAD: per cardiology   · Chronic pain: has fentanyl patch in placed and did not continue this on admit, pain management per orthopedics and discussed with Blair Gilmore, holding IV dilaudid while has fentanyl in place    · COPD: O2 as needed, continue prn duoneb  · Tobacco use: needs cessation   · Thrombocytopenia: followup CBC     Discharge planning:    DVT ppx: SCD  Code status:  Full  Estimated LOS:  Greater than 2 midnights  Risk:  high  Care plan: elly Del [facility] [phone]   Signed:  Samuel Salazar MD

## 2018-06-04 NOTE — ED TRIAGE NOTES
Patient brought in by [facility]. EMS reported patient fell yesterday at home outside. Patient was outside 2-3 hours before family came home and found her. Family put her in wheelchair and placed her in bed. Family called EMS today because patient's pain was increased. Patient is on Plavix. Currently smokes 1 pack a day. Patient has pacemaker. Initial vitals by EMS - 110/60 BP, 84 HR, .  18 gauge placed to RFA and 600 cc NS given prior to arrival.  Fentanyl patch located on right abdomen.

## 2018-06-04 NOTE — ED PROVIDER NOTES
HPI Comments: 50-year-old female presents with complaints of right hip pain. Reports that she was on her porch yesterday when she lost her balance and fell on that hip. Unfortunately, she was stuck by herself out, therefore approximately 3 hours until family members returned home. She also complains of right wrist pain. No head injury. No chest pain, shortness of breath or dizziness    Patient is a 69 y.o. female presenting with hip pain. The history is provided by the patient, a relative and the EMS personnel. Hip Injury    This is a new problem. The current episode started yesterday. The problem occurs constantly. The problem has been gradually worsening. The pain is present in the right hip. The quality of the pain is described as sharp and constant. The pain is severe. Associated symptoms include stiffness and tingling. Pertinent negatives include no neck pain. The symptoms are aggravated by movement. She has tried nothing for the symptoms. There has been a history of trauma.         Past Medical History:   Diagnosis Date    AAA (abdominal aortic aneurysm) without rupture ([facility]) 8/24/2015    Abdominal aneurysm without mention of rupture     Acute kidney failure ([facility])     Aneurysm ([facility])     AAA    Aortic stenosis 8/23/2015    Arrhythmia     heart block    Arthritis     Atherosclerosis of other specified arteries     AV block, 3rd degree ([address] 7/30/2014    CAD (coronary artery disease)     8 stents    Candidiasis of mouth     Candidiasis of the esophagus ([facility])     Cervicalgia     Chest pain     Chronic obstructive pulmonary disease ([facility])     Chronic pain     back    Coagulation disorder (HCC)     GI Bleed    COPD (chronic obstructive pulmonary disease) ([facility])     Coronary atherosclerosis of native coronary artery 6/16/2015    Coronary atherosclerosis of native coronary vessel 1/7/2016    Dehydration 8/21/2015    Diarrhea 8/21/2015    Edema     Generalized and unspecified atherosclerosis     GERD (gastroesophageal reflux disease)     Heart failure (HCC)     Hyperlipidemia     other unsp dyslipidemia    Hypertension, essential, benign     IFG (impaired fasting glucose)     Ischemic cardiomyopathy     Lower extremity edema 8/21/2015    Lumbago     Mitral valve regurgitation     Mixed hyperlipidemia 6/16/2015    N&V (nausea and vomiting) 8/20/2015    Osteoarthrosis, unspecified whether generalized or localized, unspecified site     Other malaise and fatigue     PUD (peptic ulcer disease)     SOB (shortness of breath)     Third degree heart block ([address] 1/7/2016    Tobacco use disorder     Tobacco use disorder        Past Surgical History:   Procedure Laterality Date    CARDIAC SURG PROCEDURE UNLIST      PCIs ; stents x8    HX HYSTERECTOMY      partial    HX ORTHOPAEDIC      back surgeries x 2    HX PACEMAKER  7/30/2014    [facility] pacer         Family History:   Problem Relation Age of Onset    Heart Disease Mother     Diabetes Mother     Heart Disease Father     Diabetes Father     Heart Disease Sister     Diabetes Sister     Diabetes Sister     Heart Disease Sister        Social History     Social History    Marital status:      Spouse name: N/A    Number of children: N/A    Years of education: N/A     Occupational History    Not on file. Social History Main Topics    Smoking status: Former Smoker     Packs/day: 0.50     Years: 25.00     Quit date: 10/28/2012    Smokeless tobacco: Never Used      Comment: around 2nd hand smoke    Alcohol use No    Drug use: No    Sexual activity: Not on file     Other Topics Concern    Not on file     Social History Narrative         ALLERGIES: Celebrex [celecoxib]; Codeine; Morphine; and Pravastatin    Review of Systems   Constitutional: Negative for chills and fever. HENT: Negative for congestion, ear pain and rhinorrhea. Eyes: Negative for photophobia and discharge.    Respiratory: Negative for cough and shortness of breath. Cardiovascular: Negative for chest pain and palpitations. Gastrointestinal: Negative for abdominal pain, constipation, diarrhea and vomiting. Endocrine: Negative for cold intolerance and heat intolerance. Genitourinary: Negative for dysuria and flank pain. Musculoskeletal: Positive for stiffness. Negative for arthralgias, myalgias and neck pain. Skin: Negative for rash and wound. Allergic/Immunologic: Negative for environmental allergies and food allergies. Neurological: Positive for tingling. Negative for syncope and headaches. Hematological: Negative for adenopathy. Does not bruise/bleed easily. Psychiatric/Behavioral: Negative for dysphoric mood. The patient is not nervous/anxious. All other systems reviewed and are negative. Vitals:    06/04/18 1247 06/04/18 1255   BP: 133/74    Pulse: 80    Resp: 18    Temp: 98.5 °F (36.9 °C)    SpO2: 93% 94%   Weight: 40.4 kg (89 lb)    Height: 5' 3\" (1.6 m)             Physical Exam   Constitutional: She is oriented to person, place, and time. She appears well-developed. She appears distressed. HENT:   Head: Normocephalic and atraumatic. Mouth/Throat: Oropharynx is clear and moist.   Eyes: Conjunctivae and EOM are normal. Pupils are equal, round, and reactive to light. Right eye exhibits no discharge. Left eye exhibits no discharge. No scleral icterus. Neck: Normal range of motion. Neck supple. No thyromegaly present. Cardiovascular: Normal rate, regular rhythm, normal heart sounds and intact distal pulses. Exam reveals no gallop and no friction rub. No murmur heard. Pulmonary/Chest: Effort normal and breath sounds normal. No respiratory distress. She has no wheezes. She exhibits no tenderness. Abdominal: Soft. Bowel sounds are normal. She exhibits no distension. There is no hepatosplenomegaly. There is no tenderness. There is no rebound and no guarding. No hernia.    Musculoskeletal: She exhibits no edema.        Right hip: She exhibits decreased range of motion, tenderness and deformity. Right hip is shortened and rotated. Tender to palpation  Distal pulses are 2+ and symmetric   Neurological: She is alert and oriented to person, place, and time. No cranial nerve deficit. She exhibits normal muscle tone. Skin: Skin is warm and dry. No rash noted. She is not diaphoretic. No erythema. Psychiatric: Her behavior is normal. Judgment and thought content normal. Her affect is blunt. Nursing note and vitals reviewed. MDM  Number of Diagnoses or Management Options  Closed fracture of right hip, initial encounter [facility]): new and requires workup  Diagnosis management comments: Patient's controlled substance prescription records reviewed @ the [facility] [place], operated by [facility]  Aware website. Information will be utilized for accurate and appropriate patient care.     06/01/2018 1   05/30/2018 TRAMADOL HCL 50 MG TABLET 90 15 KR FOR [id number] PRO(6827) 0 30.00 [place]   06/01/2018 1   05/30/2018 FENTANYL 100 MCG/HR PATCH 10 30 KR FOR [id number] ANP(1680) 0 240.00 [place]   05/03/2018 1   05/03/2018 TRAMADOL HCL 50 MG TABLET 90 15 KR FOR [id number] PRO(6827) 0 30.00 [place]   05/03/2018 1   05/03/2018 FENTANYL 100 MCG/HR PATCH 10 31 KR FOR [id number] EPD(4144) 0 232.26 [place]   04/04/2018 1   03/01/2018 TRAMADOL HCL 50 MG TABLET 90 15 KR FOR [id number] PRO(6827) 0 30.00 [place]   04/04/2018 1   04/02/2018 FENTANYL 100 MCG/HR PATCH 10 30 KR FOR [id number] WILFRID(1152) 0 240.00 [place]   03/19/2018 1   03/01/2018 FENTANYL 100 MCG/HR PATCH 5 16 KR FOR [id number] IWD(8850) 0 225.00 [place]   03/02/2018 1   01/30/2018 TRAMADOL HCL 50 MG TABLET 90 23 KR FOR [id number] PRO(6827) 0 19.57 [place]   03/02/2018 1   01/30/2018 FENTANYL 100 MCG/HR PATCH 5 15 KR FOR [id number] WSV(0045) 0 240.00 [place]   01/30/2018 1   12/28/2017 TRAMADOL HCL 50 MG TABLET 90 15 KR FOR [id number] YXP(5403) 0 30.00 [place]          Amount and/or Complexity of Data Reviewed  Clinical lab tests: reviewed and ordered  Tests in the radiology section of CPT®: ordered and reviewed  Tests in the medicine section of CPT®: ordered and reviewed  Obtain history from someone other than the patient: yes  Review and summarize past medical records: yes  Discuss the patient with other providers: yes  Independent visualization of images, tracings, or specimens: yes    Risk of Complications, Morbidity, and/or Mortality  Presenting problems: moderate  Diagnostic procedures: moderate  Management options: moderate  General comments: Elements of this note have been dictated via voice recognition software. Text and phrases may be limited by the accuracy of the software. The chart has been reviewed, but errors may still be present.       Patient Progress  Patient progress: stable        ED Course       Procedures

## 2018-06-04 NOTE — PROGRESS NOTES
ORTHO:    PATIENT IS TO BE ADMITTED BY HOSPITALIST FOR RIGHT HIP FRACTURE TO ROOM 726. PLEASE KEEP NPO AFTER MIDNIGHT. SURGERY PLANNED WITH DR. HAYWARD TOMORROW.

## 2018-06-04 NOTE — CONSULTS
[address] Cardiology Consult     Attending Cardiologist:Dr Buffy Gay    Primary Cardiologist:Dr. Yuniel Murcia to follow up    Primary Care Physician:Dr Kimmy Nagel                   Referring--Dr. Roxana Florence--pre-op evaluation for hip sx. Subjective:     Na James is a 69 y.o. frail female with multiple percutaneous coronary interventions, with known SHF, AS with last echo in 2015 reviewed in Epic noting AS and mildly reduced EF 40 %. . She is debilitated from back fracture and chronic pain on chronic narcotics with limited mobility. She reportedly has AAA that vascular surgeons have deemed her not to be surgical candidate. She presented with acute hip fracture from fall. Denies syncope or near syncope, just tripped. No recent complaints of chest pain. Reviewed labs. Noted Afib with Vent paced, has not been to our office since 2015 and without interrogation. She was demonstrating PAF during that interrogation. Remote hx of PUDz, no recent dark tarry or bloody stools.  Taking plavix daily PTA, no ASA     Past Medical History:   Diagnosis Date    AAA (abdominal aortic aneurysm) without rupture ([address] 8/24/2015    Abdominal aneurysm without mention of rupture     Acute kidney failure (HCC)     Aneurysm ([address]     AAA    Aortic stenosis 8/23/2015    Arrhythmia     heart block    Arthritis     Atherosclerosis of other specified arteries     AV block, 3rd degree ([address] 7/30/2014    CAD (coronary artery disease)     8 stents    Candidiasis of mouth     Candidiasis of the esophagus     Cervicalgia     Chest pain     Chronic obstructive pulmonary disease (HCC)     Chronic pain     back    Coagulation disorder (HCC)     GI Bleed    COPD (chronic obstructive pulmonary disease) ([address]     Coronary atherosclerosis of native coronary artery 6/16/2015    Coronary atherosclerosis of native coronary vessel 1/7/2016    Dehydration 8/21/2015    Diarrhea 8/21/2015    Edema     Generalized and unspecified atherosclerosis     GERD (gastroesophageal reflux disease)     Heart failure (HCC)     Hyperlipidemia     other unsp dyslipidemia    Hypertension, essential, benign     IFG (impaired fasting glucose)     Ischemic cardiomyopathy     Lower extremity edema 8/21/2015    Lumbago     Mitral valve regurgitation     Mixed hyperlipidemia 6/16/2015    N&V (nausea and vomiting) 8/20/2015    Osteoarthrosis, unspecified whether generalized or localized, unspecified site     Other malaise and fatigue     PUD (peptic ulcer disease)     SOB (shortness of breath)     Third degree heart block ([address] 1/7/2016    Tobacco use disorder     Tobacco use disorder       Past Surgical History:   Procedure Laterality Date    CARDIAC SURG PROCEDURE UNLIST      PCIs ; stents x8    HX HYSTERECTOMY      partial    HX ORTHOPAEDIC      back surgeries x 2    HX PACEMAKER  7/30/2014    [facility] p[facility]      Current Facility-Administered Medications   Medication Dose Route Frequency    [START ON 6/5/2018] ceFAZolin (ANCEF) 2 g/20 mL in sterile water IV syringe  2 g IntraVENous ON CALL TO OR    acetaminophen (TYLENOL) tablet 650 mg  650 mg Oral Q8H    traMADol (ULTRAM) tablet 50 mg  50 mg Oral Q6H PRN    oxyCODONE IR (ROXICODONE) tablet 5 mg  5 mg Oral Q4H PRN    [START ON 6/5/2018] lactated Ringers infusion  75 mL/hr IntraVENous ON CALL TO OR    albuterol-ipratropium (DUO-NEB) 2.5 MG-0.5 MG/3 ML  3 mL Nebulization Q4H PRN    [START ON 6/5/2018] pantoprazole (PROTONIX) tablet 40 mg  40 mg Oral ACB    0.9% sodium chloride infusion  100 mL/hr IntraVENous CONTINUOUS    sodium chloride 0.9 % bolus infusion 250 mL  250 mL IntraVENous CONTINUOUS    sodium chloride (NS) flush 5-10 mL  5-10 mL IntraVENous Q8H    sodium chloride (NS) flush 5-10 mL  5-10 mL IntraVENous PRN    ondansetron (ZOFRAN) injection 4 mg  4 mg IntraVENous Q6H PRN    tuberculin injection 5 Units  5 Units IntraDERMal ONCE     Allergies   Allergen Reactions    Celebrex [Celecoxib] Nausea and Vomiting    Codeine Nausea and Vomiting    Morphine Other (comments)     Hallucinations    Pravastatin Other (comments)     Mouth sores       Social History   Substance Use Topics    Smoking status: Current Every Day Smoker     Packs/day: 0.50     Years: 25.00     Last attempt to quit: 10/28/2012    Smokeless tobacco: Never Used      Comment: around 2nd hand smoke    Alcohol use No      Family History   Problem Relation Age of Onset    Heart Disease Mother     Diabetes Mother     Heart Disease Father     Diabetes Father     Heart Disease Sister     Diabetes Sister     Diabetes Sister     Heart Disease Sister         Review of Systems  Gen: Denies fever, chills, malaise or fatigue. Appetite good. HEENT: Denies frequent headaches, dizzyness, visual disturbances, Neck pain or swallowing difficulty  Lungs: Denies shortness of breath, hx of COPD, breathing problems  Cardiovascular: as above   GI: Denies hememesis, dark tarry stools, No prior Hx of GI bleed, Denies constipation  : as above , poor appetite   Heme: No prior bleeding disorders, no prior Cancer  Neuro: Denies prior CVA, TIA. Endocrine: no diabetes, thyroid disorders  Psychiatric: Denies anxiety, or other psychiatric illnesses. Objective:     Visit Vitals    /74 (BP 1 Location: Right arm, BP Patient Position: At rest)    Pulse 75    Temp 97.7 °F (36.5 °C)    Resp 19    Ht 5' 3\" (1.6 m)    Wt 40.4 kg (89 lb)    SpO2 98%    BMI 15.77 kg/m2     General:Alert, cooperative, no distress, appears stated age  Head: Normocephalic, without obvious abnormality, atraumatic. Eyes: Conjunctivae/corneas clear. PERRL, EOMs intact  Nose:Nares normal. Septum midline. Mucosa normal. No drainage or sinus tenderness. Throat: Lips, mucosa, and tongue normal. Teeth and gums normal.   Neck: Supple, symmetrical, trachea midline,  no carotid bruit and no JVD.    Lungs:Clear to auscultation bilaterally. Chest wall: No tenderness or deformity. Heart: Regular rate and rhythm, S1, S2 normal, no murmur, click, rub or gallop. Abdomen:Soft, non-tender. Bowel sounds normal. No masses, No organomegaly. Extremities: Extremities normal, atraumatic, no cyanosis or edema. Pulses: 2+ and symmetric all extremities. Skin: Skin color, texture, turgor normal. No rashes or lesions  Lymph nodes: Cervical, supraclavicular, and axillary nodes normal  Neurologic:No focal deficits identified                 ECG:afib with Vent paced. Data Review:     Recent Results (from the past 24 hour(s))   EKG, 12 LEAD, INITIAL    Collection Time: 06/04/18 12:57 PM   Result Value Ref Range    Ventricular Rate 78 BPM    Atrial Rate 99 BPM    QRS Duration 184 ms    Q-T Interval 436 ms    QTC Calculation (Bezet) 497 ms    Calculated R Axis -69 degrees    Calculated T Axis 99 degrees    Diagnosis       !! AGE AND GENDER SPECIFIC ECG ANALYSIS !!   Atrial fibrillation  Left axis deviation  Right bundle branch block  Septal infarct , age undetermined  Possible Lateral infarct , age undetermined  Abnormal ECG  When compared with ECG of 20-AUG-2015 10:35,  Atrial fibrillation has replaced Electronic ventricular pacemaker     TYPE & SCREEN    Collection Time: 06/04/18 12:58 PM   Result Value Ref Range    Crossmatch Expiration 06/07/2018     ABO/Rh(D) A POSITIVE     Antibody screen NEG    CBC W/O DIFF    Collection Time: 06/04/18  1:03 PM   Result Value Ref Range    WBC 9.2 4.3 - 11.1 K/uL    RBC 3.89 (L) 4.05 - 5.25 M/uL    HGB 12.6 11.7 - 15.4 g/dL    HCT 37.8 35.8 - 46.3 %    MCV 97.2 79.6 - 97.8 FL    MCH 32.4 26.1 - 32.9 PG    MCHC 33.3 31.4 - 35.0 g/dL    RDW 15.6 (H) 11.9 - 14.6 %    PLATELET 367 (L) 474 - 450 K/uL    MPV 9.9 (L) 10.8 - 83.1 FL   METABOLIC PANEL, COMPREHENSIVE    Collection Time: 06/04/18  1:03 PM   Result Value Ref Range    Sodium 143 136 - 145 mmol/L    Potassium 3.2 (L) 3.5 - 5.1 mmol/L    Chloride 113 (H) 98 - 107 mmol/L    CO2 23 21 - 32 mmol/L    Anion gap 7 7 - 16 mmol/L    Glucose 118 (H) 65 - 100 mg/dL    BUN 16 8 - 23 MG/DL    Creatinine 0.94 0.6 - 1.0 MG/DL    GFR est AA >60 >60 ml/min/1.73m2    GFR est non-AA >60 >60 ml/min/1.73m2    Calcium 8.2 (L) 8.3 - 10.4 MG/DL    Bilirubin, total 0.5 0.2 - 1.1 MG/DL    ALT (SGPT) 12 12 - 65 U/L    AST (SGOT) 16 15 - 37 U/L    Alk. phosphatase 50 50 - 136 U/L    Protein, total 5.9 (L) 6.3 - 8.2 g/dL    Albumin 2.6 (L) 3.2 - 4.6 g/dL    Globulin 3.3 2.3 - 3.5 g/dL    A-G Ratio 0.8 (L) 1.2 - 3.5     PROTHROMBIN TIME + INR    Collection Time: 06/04/18  1:03 PM   Result Value Ref Range    Prothrombin time 13.5 11.5 - 14.5 sec    INR 1.1     PTT    Collection Time: 06/04/18  1:03 PM   Result Value Ref Range    aPTT 27.0 23.2 - 35.3 SEC   URINALYSIS W/ RFLX MICROSCOPIC    Collection Time: 06/04/18  3:49 PM   Result Value Ref Range    Color YELLOW      Appearance CLEAR      Specific gravity 1.017 1.001 - 1.023      pH (UA) 6.5 5.0 - 9.0      Protein 30 (A) NEG mg/dL    Glucose NEGATIVE  mg/dL    Ketone NEGATIVE  NEG mg/dL    Bilirubin NEGATIVE  NEG      Blood NEGATIVE  NEG      Urobilinogen 1.0 0.2 - 1.0 EU/dL    Nitrites NEGATIVE  NEG      Leukocyte Esterase TRACE (A) NEG      WBC 0-3 0 /hpf    RBC 5-10 0 /hpf    Epithelial cells 3-5 0 /hpf    Bacteria 0 0 /hpf    Other observations MODERATE RENAL TUBULAR CELLS    PTH INTACT    Collection Time: 06/04/18  4:22 PM   Result Value Ref Range    Calcium 8.5 8.3 - 10.4 MG/DL    PTH, Intact 32.5 18.5 - 88.0 pg/mL   PREALBUMIN    Collection Time: 06/04/18  4:22 PM   Result Value Ref Range    Prealbumin 11.8 (L) 18.0 - 35.7 MG/DL         Assessment / Plan     Principal Problem:    Hip fracture (HCC) (6/4/2018)--pt at high surgical risk, will need Echo in am to fully assess. Has underlying afib not on oral anticoagulation due to prior GIB, Thrombocytopenia, increasing risk     Active Problems:     Aortic stenosis (8/23/2015)--as above, need to assess severity to see if she will tolerate anesthesia       AAA (abdominal aortic aneurysm) without rupture ([facility] (8/24/2015)      Overview: surg noted reviewed, high surg risk, PVD limits stent graft options      COPD (chronic obstructive pulmonary disease) (HCC) ()      Coronary atherosclerosis of native coronary vessel (1/7/2016)--as above, holding plt inhibitors at present --not on home asa, plavix for years for mulitple stents. Systolic dysfunction (0/2/9412)--[id number] to be euvolemic by exam, last echo with EF 40%.        Diastolic dysfunction (6/2/9661)      Chronic pain (6/4/2018)      Hypokalemia (6/4/2018)      Thrombocytopenia (HCC) (6/4/2018)--as above-she has been on chronic plavix therapy for years               Margaret Goodman NP

## 2018-06-05 NOTE — PERIOP NOTES
TRANSFER - OUT REPORT:    Verbal report given to [address] [place] [address]  on Osker Chris  being transferred to  for routine post - op       Report consisted of patients Situation, Background, Assessment and   Recommendations(SBAR). Information from the following report(s) SBAR, Kardex, OR Summary and MAR was reviewed with the receiving nurse. Lines:   Peripheral IV 06/05/18 Right Forearm (Active)   Site Assessment Clean, dry, & intact 6/5/2018  2:14 PM   Phlebitis Assessment 0 6/5/2018  2:14 PM   Infiltration Assessment 0 6/5/2018  2:14 PM   Dressing Status Clean, dry, & intact 6/5/2018  2:14 PM   Dressing Type Transparent 6/5/2018  2:14 PM   Hub Color/Line Status Green; Infusing 6/5/2018 11:00 AM        Opportunity for questions and clarification was provided. Patient transported with:   O2 @ 2 liters  Tech    VTE prophylaxis orders have been written for [facility]. Patient and family given floor number and nurses name. Family updated re: pt status after security code verified.

## 2018-06-05 NOTE — PROGRESS NOTES
Hospitalist Progress Note    2018  Admit Date: 2018 12:55 PM   NAME: Js Riggs   :  1940   MRN:  [id number]   Attending: Mireille Wiggins MD  PCP:  Tesfaye Meredith MD    SUBJECTIVE:     Js Riggs is a 64KTL with chronic combined s/d CHF, AAA 5.1cm, COPD on nocturnal O2, 3rd degree heart block s/p PPM, moderate AS, chronic pain on fentanyl patch who was admitted on  with a mechanical fall and resultant R hip fx. She has been seen by cardiology given her extensive medical hx and has been optimized for surgery. : doing well today. Pain controlled. Plan for surgery this afternoon. Review of Systems negative with exception of pertinent positives noted above      PHYSICAL EXAM       Visit Vitals    /64 (BP 1 Location: Right arm, BP Patient Position: At rest)    Pulse 63    Temp 98.5 °F (36.9 °C)    Resp 18    Ht 5' 3\" (1.6 m)    Wt 40.4 kg (89 lb)    SpO2 94%    BMI 15.77 kg/m2      Temp (24hrs), Av.3 °F (36.8 °C), Min:97.6 °F (36.4 °C), Max:99.1 °F (37.3 °C)    Oxygen Therapy  O2 Sat (%): 94 % (18 0728)  Pulse via Oximetry: 72 beats per minute (18 1700)  O2 Device: Nasal cannula (18)  O2 Flow Rate (L/min): 3 l/min (18 182)    Intake/Output Summary (Last 24 hours) at 18 1040  Last data filed at 18 0429   Gross per 24 hour   Intake                0 ml   Output              200 ml   Net             -200 ml          General: No acute distress. Head:  Atraumatic Normocephalic. Lungs:  Clear, 2L NC in place   CVS:  RRR  Abdomen: Soft, NTTP  Neurologic:  AOx3.  No focal deficits  Psychiatry:      No anxiety/Depression    Recent Results (from the past 24 hour(s))   EKG, 12 LEAD, INITIAL    Collection Time: 18 12:57 PM   Result Value Ref Range    Ventricular Rate 78 BPM    Atrial Rate 99 BPM    QRS Duration 184 ms    Q-T Interval 436 ms    QTC Calculation (Bezet) 497 ms    Calculated R Axis -69 degrees    Calculated T Axis 99 degrees    Diagnosis       AV sequential or dual chamber electronic pacemaker    Abnormal ECG  When compared with ECG of 20-AUG-2015 10:35,  No significant change was found  Confirmed by ST REBEKAH ANDERSON MD (), ENRIQUE GALE ([id number]) on 6/5/2018 9:44:29 AM     TYPE & SCREEN    Collection Time: 06/04/18 12:58 PM   Result Value Ref Range    Crossmatch Expiration 06/07/2018     ABO/Rh(D) A POSITIVE     Antibody screen NEG    CBC W/O DIFF    Collection Time: 06/04/18  1:03 PM   Result Value Ref Range    WBC 9.2 4.3 - 11.1 K/uL    RBC 3.89 (L) 4.05 - 5.25 M/uL    HGB 12.6 11.7 - 15.4 g/dL    HCT 37.8 35.8 - 46.3 %    MCV 97.2 79.6 - 97.8 FL    MCH 32.4 26.1 - 32.9 PG    MCHC 33.3 31.4 - 35.0 g/dL    RDW 15.6 (H) 11.9 - 14.6 %    PLATELET 803 (L) 117 - 450 K/uL    MPV 9.9 (L) 10.8 - 69.7 FL   METABOLIC PANEL, COMPREHENSIVE    Collection Time: 06/04/18  1:03 PM   Result Value Ref Range    Sodium 143 136 - 145 mmol/L    Potassium 3.2 (L) 3.5 - 5.1 mmol/L    Chloride 113 (H) 98 - 107 mmol/L    CO2 23 21 - 32 mmol/L    Anion gap 7 7 - 16 mmol/L    Glucose 118 (H) 65 - 100 mg/dL    BUN 16 8 - 23 MG/DL    Creatinine 0.94 0.6 - 1.0 MG/DL    GFR est AA >60 >60 ml/min/1.73m2    GFR est non-AA >60 >60 ml/min/1.73m2    Calcium 8.2 (L) 8.3 - 10.4 MG/DL    Bilirubin, total 0.5 0.2 - 1.1 MG/DL    ALT (SGPT) 12 12 - 65 U/L    AST (SGOT) 16 15 - 37 U/L    Alk.  phosphatase 50 50 - 136 U/L    Protein, total 5.9 (L) 6.3 - 8.2 g/dL    Albumin 2.6 (L) 3.2 - 4.6 g/dL    Globulin 3.3 2.3 - 3.5 g/dL    A-G Ratio 0.8 (L) 1.2 - 3.5     PROTHROMBIN TIME + INR    Collection Time: 06/04/18  1:03 PM   Result Value Ref Range    Prothrombin time 13.5 11.5 - 14.5 sec    INR 1.1     PTT    Collection Time: 06/04/18  1:03 PM   Result Value Ref Range    aPTT 27.0 23.2 - 35.3 SEC   MAGNESIUM    Collection Time: 06/04/18  1:03 PM   Result Value Ref Range    Magnesium 1.7 (L) 1.8 - 2.4 mg/dL   URINALYSIS W/ RFLX MICROSCOPIC    Collection Time: 06/04/18  3:49 PM Result Value Ref Range    Color YELLOW      Appearance CLEAR      Specific gravity 1.017 1.001 - 1.023      pH (UA) 6.5 5.0 - 9.0      Protein 30 (A) NEG mg/dL    Glucose NEGATIVE  mg/dL    Ketone NEGATIVE  NEG mg/dL    Bilirubin NEGATIVE  NEG      Blood NEGATIVE  NEG      Urobilinogen 1.0 0.2 - 1.0 EU/dL    Nitrites NEGATIVE  NEG      Leukocyte Esterase TRACE (A) NEG      WBC 0-3 0 /hpf    RBC 5-10 0 /hpf    Epithelial cells 3-5 0 /hpf    Bacteria 0 0 /hpf    Other observations MODERATE RENAL TUBULAR CELLS    PTH INTACT    Collection Time: 06/04/18  4:22 PM   Result Value Ref Range    Calcium 8.5 8.3 - 10.4 MG/DL    PTH, Intact 32.5 18.5 - 88.0 pg/mL   PREALBUMIN    Collection Time: 06/04/18  4:22 PM   Result Value Ref Range    Prealbumin 11.8 (L) 18.0 - 35.7 MG/DL   PROTHROMBIN TIME + INR    Collection Time: 06/04/18  7:10 PM   Result Value Ref Range    Prothrombin time 13.9 11.5 - 14.5 sec    INR 1.1     MSSA/MRSA SC BY PCR, NASAL SWAB    Collection Time: 06/04/18  7:24 PM   Result Value Ref Range    Special Requests: NO SPECIAL REQUESTS      Culture result:        SA target not detected. A MRSA NEGATIVE, SA NEGATIVE test result does not preclude MRSA or SA nasal colonization.    METABOLIC PANEL, BASIC    Collection Time: 06/05/18  4:37 AM   Result Value Ref Range    Sodium 146 (H) 136 - 145 mmol/L    Potassium 4.1 3.5 - 5.1 mmol/L    Chloride 116 (H) 98 - 107 mmol/L    CO2 22 21 - 32 mmol/L    Anion gap 8 7 - 16 mmol/L    Glucose 72 65 - 100 mg/dL    BUN 14 8 - 23 MG/DL    Creatinine 0.88 0.6 - 1.0 MG/DL    GFR est AA >60 >60 ml/min/1.73m2    GFR est non-AA >60 >60 ml/min/1.73m2    Calcium 8.0 (L) 8.3 - 10.4 MG/DL   CBC WITH AUTOMATED DIFF    Collection Time: 06/05/18  4:37 AM   Result Value Ref Range    WBC 8.0 4.3 - 11.1 K/uL    RBC 3.88 (L) 4.05 - 5.25 M/uL    HGB 12.4 11.7 - 15.4 g/dL    HCT 38.2 35.8 - 46.3 %    MCV 98.5 (H) 79.6 - 97.8 FL    MCH 32.0 26.1 - 32.9 PG    MCHC 32.5 31.4 - 35.0 g/dL    RDW 16.0 (H) 11.9 - 14.6 %    PLATELET 621 (L) 484 - 450 K/uL    MPV 9.7 (L) 10.8 - 14.1 FL    DF AUTOMATED      NEUTROPHILS 69 43 - 78 %    LYMPHOCYTES 20 13 - 44 %    MONOCYTES 11 4.0 - 12.0 %    EOSINOPHILS 0 (L) 0.5 - 7.8 %    BASOPHILS 0 0.0 - 2.0 %    IMMATURE GRANULOCYTES 0 0.0 - 5.0 %    ABS. NEUTROPHILS 5.4 1.7 - 8.2 K/UL    ABS. LYMPHOCYTES 1.6 0.5 - 4.6 K/UL    ABS. MONOCYTES 0.9 0.1 - 1.3 K/UL    ABS. EOSINOPHILS 0.0 0.0 - 0.8 K/UL    ABS. BASOPHILS 0.0 0.0 - 0.2 K/UL    ABS. IMM. GRANS. 0.0 0.0 - 0.5 K/UL         Imaging /Procedures /Studies   XR CHEST SNGL V   Final Result   IMPRESSION: Chronic appearing changes without evidence of active failure. XR FEMUR RT 2 VS   Final Result   IMPRESSION: Proximal femoral fracture. XR HIP RT W OR WO PELV 2-3 VWS   Final Result   IMPRESSION: Fracture right femur with questionable right inferior pubic ramus   fracture. XR WRIST RT AP/LAT/OBL MIN 3V   Final Result   IMPRESSION: Demineralization, very questionable injury to the proximal fourth   metacarpal. If there is point tenderness here, follow-up in 7-10 days should be   considered.             ASSESSMENT      Hospital Problems as of 6/5/2018  Date Reviewed: 2/23/2016          Codes Class Noted - Resolved POA    * (Principal)Hip fracture ([facility] [address] ICD-10-CM: S72.009A  ICD-9-CM: 820.8  6/4/2018 - Present Yes        Systolic dysfunction (Chronic) ICD-10-CM: I51.9  ICD-9-CM: 429.9  6/4/2018 - Present Yes        Diastolic dysfunction (Chronic) ICD-10-CM: I51.9  ICD-9-CM: 429.9  6/4/2018 - Present Yes        Chronic pain (Chronic) ICD-10-CM: G89.29  ICD-9-CM: 338.29  6/4/2018 - Present Yes        Hypokalemia ICD-10-CM: E87.6  ICD-9-CM: 276.8  6/4/2018 - Present Yes        Thrombocytopenia (HCC) ICD-10-CM: D69.6  ICD-9-CM: 287.5  6/4/2018 - Present Yes        Coronary atherosclerosis of native coronary vessel ICD-10-CM: I25.10  ICD-9-CM: 414.01  1/7/2016 - Present Yes        COPD (chronic obstructive pulmonary disease) ([facility] [address] ICD-10-CM: J44.9  ICD-9-CM: 443  Unknown - Present Yes        AAA (abdominal aortic aneurysm) without rupture (HCC) (Chronic) ICD-10-CM: I71.4  ICD-9-CM: 441.4  8/24/2015 - Present Yes    Overview Signed 10/31/2016  3:21 PM by Melinda Ellis     surg noted reviewed, high surg risk, PVD limits stent graft options             Aortic stenosis ICD-10-CM: I35.0  ICD-9-CM: 424.1 Acute 8/23/2015 - Present Yes                  Plan:  - R hip fx:  Plan for surgery today  Post-op management per ortho    - chronic systolic/diastolic CHF:  Not in exacerbation  Stable per echo today  Medically optimized per cardiology    - COPD:  Not in exacerbation  Continue O2 at night; may need post-op as well  Will closely monitor resp status after surgery    - Chronic pain:   Holding home fentanyl patch  Post-op pain management per ortho    - Thrombocytopenia:  Stable today  Will continue to monitor throughout hospitalization    - AAA:  Stable  Not candidate for surgical intervention    DVT Prophylaxis: SCDs  Dispo: PT/OT/PPD     Katia Genao MD

## 2018-06-05 NOTE — PROGRESS NOTES
[facility] CARDIOLOGY PROGRESS NOTE           6/5/2018 8:36 AM    Admit Date: 6/4/2018      Subjective:   No o/e. No CP or SOB. ROS:  Cardiovascular:  As noted above    Objective:      Vitals:    06/04/18 2038 06/05/18 0025 06/05/18 0422 06/05/18 0728   BP: 126/64 133/71 118/60 115/64   Pulse: 77 83 66 63   Resp: 19 19 19 18   Temp: 99.1 °F (37.3 °C) 97.6 °F (36.4 °C) 98.2 °F (36.8 °C) 98.5 °F (36.9 °C)   SpO2: 99% 92% 96% 94%   Weight:       Height:           Physical Exam:  General-No Acute Distress, 3L O2 by NC  Neck- supple, no JVD  CV- regular rate and rhythm, PM chest wall   Lung- clear bilaterally  Abd- soft, nontender, nondistended  Ext- R hip stabilizer; trace edema   Skin- warm and dry    Data Review:   Recent Labs      06/05/18   0437  06/04/18   1910  06/04/18   1303   NA  146*   --   143   K  4.1   --   3.2*   MG   --    --   1.7*   BUN  14   --   16   CREA  0.88   --   0.94   GLU  72   --   118*   WBC  8.0   --   9.2   HGB  12.4   --   12.6   HCT  38.2   --   37.8   PLT  131*   --   138*   INR   --   1.1  1.1       Assessment/Plan:   Hip fracture (HCC) (6/4/2018)- Pending surgical repair, echo to reassess EF, high risk for surgical complication but other than echo no further evaluation indicated prior to non elective surgery. Aortic stenosis (8/23/2015)- Echo pending. AAA (abdominal aortic aneurysm) without rupture - Followed by vascular, not surgical candidate. COPD (chronic obstructive pulmonary disease) ([facility]) ()- Cont current meds, currently 3L O2 (none at home). Systolic dysfunction (6/2/4048)- Prior EF 40% w AS, repeat echo pending, no ACE/ARB or BB due to hypotension. Chronic pain (6/4/2018)- Per primary. Hypokalemia (6/4/2018)- Resolved. Thrombocytopenia ([address] (6/4/2018)- Platelets 105, stable. KWAME Aguilar  6/5/2018 8:36 AM        I have personally seen and examined patient and agree with above assessment.  I agree and confirm with findings with additional details/exceptions as listed below:    Prelim echo images reviewed with EF ~45% with apical/basal inferior wall hypokinesis; mod AS and mod to sev MR/TR; no significant change from prior. No active cardiac conditions and non elective surgery. Likely higher risk for perioperative complications but acceptable risk in the setting of non elective hip surgery and no active cardiac conditions. No further cardiac workup needed at this time  Noted prior AAA of 5.1cm in 2015 and deemed not a candidate for repair.      Kavitha Antoine MD  6/5/2018  9:34 AM

## 2018-06-05 NOTE — PROGRESS NOTES
Met with pt and grand daughter Breonna Weiss [phone], pt is alert and oriented x3, states lives in own home with grand daughter, her spouse and cousin, states 1 level home with approx 3 steps to enter, has walker and cane at home for use. Referrals obtained for [facility] and [facility] for Ryan Guo referrals faxed as requested. Will follow for available beds. PPD placed , PT/OT ordered    CM will continue to follow for DC needs. Pt states she is current with Palliative care with [facility] pt unsure of name of agency at this time, thinks this may be correct. Care Management Interventions  PCP Verified by CM:  Yes  Transition of Care Consult (CM Consult): Discharge Planning, SNF  Current Support Network: Own Home (grand daughter and her family lives wt pt.)  Confirm Follow Up Transport: Family  Plan discussed with Pt/Family/Caregiver: Yes  Freedom of Choice Offered: Yes  Discharge Location  Discharge Placement: Skilled nursing facility

## 2018-06-05 NOTE — PROGRESS NOTES
Problem: Falls - Risk of  Goal: *Absence of Falls  Document Mary Alice Fall Risk and appropriate interventions in the flowsheet.    Outcome: Progressing Towards Goal  Fall Risk Interventions:  Mobility Interventions: Assess mobility with egress test, Bed/chair exit alarm, Patient to call before getting OOB         Medication Interventions: Assess postural VS orthostatic hypotension, Bed/chair exit alarm, Patient to call before getting OOB    Elimination Interventions: Bed/chair exit alarm, Call light in reach, Patient to call for help with toileting needs    History of Falls Interventions: Bed/chair exit alarm, Door open when patient unattended, Investigate reason for fall

## 2018-06-05 NOTE — BRIEF OP NOTE
BRIEF OPERATIVE NOTE    Date of Procedure: 6/5/2018   Preoperative Diagnosis: Closed intertrochanteric fracture of hip, right, initial [facility] [B57.400N]  Postoperative Diagnosis: Right intertrochanteric femur fracture    Procedure(s):  RIGHT FEMUR INSERTION INTRA MEDULLARY NAIL   Surgeon(s) and Role:     * Yuriy Ma MD - Primary         Surgical Assistant: NONE    Surgical Staff:  Circ-1: Tono Henry RN  Circ-Relief: Efrain Ruffin RN  Scrub Tech-1: Fabio Trujillo  Scrub Tech-2: Zunilda Fregoso  Event Time In   Incision Start 1340   Incision Close 1359     Anesthesia: General   Estimated Blood Loss: 50 CC  Specimens: * No specimens in log *   Findings: NONE   Complications: NONE  Implants:   Implant Name Type Inv.  Item Serial No.  Lot No. LRB No. Used Action   NAIL CHERYL 130D [id number] RT -- [employer] STRL - [id number]  NAIL CHERYL 130D [id number] RT -- [employer] [facility] [id number] Right 1 Implanted   BLADE HELCL FEN 95MM STRL -- [employer] - [id number]   BLADE HELCL FEN 95MM STRL -- [employer]   [employer] [place] [id number] Right 1 Implanted

## 2018-06-05 NOTE — PROGRESS NOTES
Problem: Mobility Impaired (Adult and Pediatric)  Goal: *Acute Goals and Plan of Care (Insert Text)  STG:  (1.)Ms. Doc Hart will move from supine to sit and sit to supine , scoot up and down and roll side to side with MAXIMAL ASSIST within 3 treatment day(s). (2.)Ms. Doc Hart will transfer from bed to chair and chair to bed with MAXIMAL ASSIST using the least restrictive device within 3 treatment day(s). (3.)Ms. Doc Hart will ambulate with MAXIMAL ASSIST for 10 feet with the least restrictive device within 3 treatment day(s). LTG:  (1.)Ms. Doc Hart will move from supine to sit and sit to supine , scoot up and down and roll side to side in bed with MINIMAL ASSIST within 7 treatment day(s). (2.)Ms. Doc Hart will transfer from bed to chair and chair to bed with MINIMAL ASSIST using the least restrictive device within 7 treatment day(s). (3.)Ms. Doc Hart will ambulate with MINIMAL ASSIST for 75 feet with the least restrictive device within 7 treatment day(s). ________________________________________________________________________________________________      PHYSICAL THERAPY: Initial Assessment, PM 6/5/2018  INPATIENT: Hospital Day: 2  Payor: CARE IMPROVEMENT PLUS / [place] / Product Type: Managed Care Medicare /    R MIRZA WB     NAME/AGE/GENDER: Kaia David is a 69 y.o. female   PRIMARY DIAGNOSIS: Closed intertrochanteric fracture of hip, right, initial encounter ([facility] [S72.141A] Hip fracture ([facility] [facility] Hip fracture ([facility] [address] [facility]  Procedure(s) (LRB):  RIGHT FEMUR INSERTION INTRA MEDULLARY NAIL / ROOM 726 (Right)  Day of Surgery  ICD-10: Treatment Diagnosis:    · Generalized Muscle Weakness (M62.81)  · History of falling (Z91.81)   Precaution/Allergies:  Celebrex [celecoxib]; Codeine; Morphine; and Pravastatin      ASSESSMENT:     .Ms. Doc Hart is a 69 y.o. female in the hospital for the above who was supine in bed upon arrival.  Pt reports that she lives in a one story house with family that has 3 steps to enter.  Pt also reported that PTA she was independent with ADLs and ambulated with SPC. Pt admitted to one recent fall in the past year. Ms. Doc Hart presents to PT with generally decreased AROM and strength in B LEs. During evaluation pt refused to attempt any bed mobility due to high level of pain. She was transferred from bed to chair with total assist via sheet transfer. Ms. Doc Hart could benefit from skilled PT as she is currently functioning below her baseline. This section established at most recent assessment   PROBLEM LIST (Impairments causing functional limitations):  1. Decreased Strength  2. Decreased Transfer Abilities  3. Decreased Ambulation Ability/Technique  4. Decreased Balance  5. Increased Pain  6. Decreased Activity Tolerance   INTERVENTIONS PLANNED: (Benefits and precautions of physical therapy have been discussed with the patient.)  1. Balance Exercise  2. Bed Mobility  3. Family Education  4. Gait Training  5. Therapeutic Activites  6. Therapeutic Exercise/Strengthening  7. Transfer Training  8. Group Therapy     TREATMENT PLAN: Frequency/Duration: twice daily for duration of hospital stay  Rehabilitation Potential For Stated Goals: Good     RECOMMENDED REHABILITATION/EQUIPMENT: (at time of discharge pending progress): Due to the probability of continued deficits (see above) this patient will likely need continued skilled physical therapy after discharge. Equipment:    None at this time              HISTORY:   History of Present Injury/Illness (Reason for Referral):  S/P RIGHT FEMUR INSERTION INTRA MEDULLARY NAIL / [address] (Right)  Past Medical History/Comorbidities:   Ms. Doc Hart  has a past medical history of AAA (abdominal aortic aneurysm) without rupture ([address] (8/24/2015); Abdominal aneurysm without mention of rupture; Acute kidney failure ([address] Aneurysm ([address] Aortic stenosis (8/23/2015); Arrhythmia; Arthritis;  Atherosclerosis of other specified arteries; AV block, 3rd degree [facility]) (7/30/2014); CAD (coronary artery disease); Candidiasis of mouth; Candidiasis of the esophagus; Cervicalgia; Chest pain; Chronic obstructive pulmonary disease ([facility] Chronic pain; Coagulation disorder ([facility] COPD (chronic obstructive pulmonary disease) ([facility] [address] [facility] Coronary atherosclerosis of native coronary artery (6/16/2015); Coronary atherosclerosis of native coronary vessel (1/7/2016); Dehydration (8/21/2015); Diarrhea (8/21/2015); Edema; Generalized and unspecified atherosclerosis; GERD (gastroesophageal reflux disease); Heart failure ([facility] [facility] Hyperlipidemia; Hypertension, essential, benign; IFG (impaired fasting glucose); Ischemic cardiomyopathy; Lower extremity edema (8/21/2015); Lumbago; Mitral valve regurgitation; Mixed hyperlipidemia (6/16/2015); N&V (nausea and vomiting) (8/20/2015); Osteoarthrosis, unspecified whether generalized or localized, unspecified site; Other malaise and fatigue; PUD (peptic ulcer disease); SOB (shortness of breath); Third degree heart block ([facility] [facility] (1/7/2016); Tobacco use disorder; and Tobacco use disorder. She also has no past medical history of Chronic kidney disease or Stroke ([facility] [address] Ms. Abundio Melvin  has a past surgical history that includes pr cardiac surg procedure unlist; hx orthopaedic; hx hysterectomy; and hx pacemaker (7/30/2014). Social History/Living Environment:   Home Environment: Private residence  # Steps to Enter: 3  Rails to Enter: Yes  Hand Rails : Right  One/Two Story Residence: One story  Living Alone: No  Support Systems: Family member(s)  Patient Expects to be Discharged to[de-identified] Unknown  Current DME Used/Available at Home: Wheelchair, Walker, rolling  Tub or Shower Type: Shower  Prior Level of Function/Work/Activity:  Pt lives in a one story house with her family and PTA pt was independent with ADLs. Pt ambulated with SPC PTA. One recent fall reported.        Number of Personal Factors/Comorbidities that affect the Plan of Care: 3+: HIGH COMPLEXITY   EXAMINATION: Most Recent Physical Functioning:   Gross Assessment:  AROM: Generally decreased, functional  Strength: Generally decreased, functional               Posture:     Balance:  Sitting: Intact; With support Bed Mobility:  Supine to Sit: Total assistance  Wheelchair Mobility:     Transfers:  Bed to Chair: Total assistance  Gait:            Body Structures Involved:  1. Bones  2. Joints  3. Muscles Body Functions Affected:  1. Neuromusculoskeletal  2. Movement Related Activities and Participation Affected:  1. General Tasks and Demands  2. Mobility  3. Self Care  4. Domestic Life  5. Community, Social and [place]   Number of elements that affect the Plan of Care: 4+: HIGH COMPLEXITY   CLINICAL PRESENTATION:   Presentation: Evolving clinical presentation with changing clinical characteristics: MODERATE COMPLEXITY   CLINICAL DECISION MAKIN [facility] Inpatient Short Form  How much difficulty does the patient currently have. .. Unable A Lot A Little None   1. Turning over in bed (including adjusting bedclothes, sheets and blankets)? [] 1   [x] 2   [] 3   [] 4   2. Sitting down on and standing up from a chair with arms ( e.g., wheelchair, bedside commode, etc.)   [x] 1   [] 2   [] 3   [] 4   3. Moving from lying on back to sitting on the side of the bed? [] 1   [x] 2   [] 3   [] 4   How much help from another person does the patient currently need. .. Total A Lot A Little None   4. Moving to and from a bed to a chair (including a wheelchair)? [x] 1   [] 2   [] 3   [] 4   5. Need to walk in hospital room? [x] 1   [] 2   [] 3   [] 4   6. Climbing 3-5 steps with a railing? [x] 1   [] 2   [] 3   [] 4   © , Trustees of [address] [place] [address] [address] [address], under license to [employer]. All rights reserved      Score:  Initial: 8 Most Recent: X (Date: -- )    Interpretation of Tool:  Represents activities that are increasingly more difficult (i.e. Bed mobility, Transfers, Gait). Score 24 23 22-20 19-15 14-10 9-7 6     Modifier CH CI CJ CK CL CM CN      ? Mobility - Walking and Moving Around:     - CURRENT STATUS: CM - 80%-99% impaired, limited or restricted    - GOAL STATUS: CL - 60%-79% impaired, limited or restricted    - D/C STATUS:  ---------------To be determined---------------  Payor: CARE IMPROVEMENT PLUS [place] / Product Type: [employer] Medicare /      Medical Necessity:     · Patient demonstrates good rehab potential due to higher previous functional level. Reason for Services/Other Comments:  · Patient continues to require skilled intervention due to decreased functional mobility. Use of outcome tool(s) and clinical judgement create a POC that gives a: Questionable prediction of patient's progress: MODERATE COMPLEXITY            TREATMENT:   (In addition to Assessment/Re-Assessment sessions the following treatments were rendered)   Pre-treatment Symptoms/Complaints:  Post op pain  Pain: Initial:   Pain Intensity 1: 10  Pain Location 1: Hip  Pain Orientation 1: Right  Post Session:  No change reported     Assessment/Reassessment only, no treatment provided today    Braces/Orthotics/Lines/Etc:   · IV  · auguste catheter  · O2 Device: Nasal cannula  Treatment/Session Assessment:    · Response to Treatment:  Tolerated poorly given high levels of pain throughout. · Interdisciplinary Collaboration:   o Physical Therapist  o Physical Therapy Assistant  o Certified Occupational Therapy Assistant  o Registered Nurse  o Rehabilitation Attendant  · After treatment position/precautions:   o Up in chair  o Bed alarm/tab alert on  o Bed/Chair-wheels locked  o Call light within reach  o RN notified  o Family at bedside   · Compliance with Program/Exercises: Will assess as treatment progresses. · Recommendations/Intent for next treatment session:   \"Next visit will focus on advancements to more challenging activities and reduction in assistance provided\".   Total Treatment Duration:  PT Patient Time In/Time Out  Time In: 1550  Time Out: [address], PT, DPT

## 2018-06-05 NOTE — ANESTHESIA POSTPROCEDURE EVALUATION
Post-Anesthesia Evaluation and Assessment    Patient: Js Riggs MRN: [id number]  SSN: [id number]    YOB: 1940  Age: 69 y.o. Sex: female       Cardiovascular Function/Vital Signs  Visit Vitals    /60    Pulse 76    Temp 36.6 °C (97.9 °F)    Resp 16    Ht 5' 3\" (1.6 m)    Wt 40.4 kg (89 lb)    SpO2 95%    BMI 15.77 kg/m2       Patient is status post general anesthesia for Procedure(s):  RIGHT FEMUR INSERTION INTRA MEDULLARY NAIL / ROOM 726. Nausea/Vomiting: None    Postoperative hydration reviewed and adequate. Pain:  Pain Scale 1: Numeric (0 - 10) (06/05/18 1434)  Pain Intensity 1: 0 (06/05/18 1434)   Managed    Neurological Status:   Neuro (WDL): Within Defined Limits (06/05/18 1434)  Neuro  RLE Motor Response: Weak (06/04/18 1926)   At baseline    Mental Status and Level of Consciousness: Arousable    Pulmonary Status:   O2 Device: Nasal cannula (06/05/18 1414)   Adequate oxygenation and airway patent    Complications related to anesthesia: None    Post-anesthesia assessment completed.  No concerns    Signed By: Carolyn Davila MD     June 5, 2018

## 2018-06-05 NOTE — PERIOP NOTES
TRANSFER - IN REPORT:    Verbal report received from [address] on [address]  being received from room 726 for routine progression of care      Report consisted of patients Situation, Background, Assessment and   Recommendations(SBAR). Information from the following report(s) SBAR, Kardex and MAR was reviewed with the receiving nurse. Opportunity for questions and clarification was provided. Assessment completed upon patients arrival to unit and care assumed.

## 2018-06-06 NOTE — PROGRESS NOTES
6/6/2018 10:25 AM    Admit Date: 6/4/2018        Subjective:     Curt Hidalgo reports she is doin OK No cardiac problems post op. Objective:      Visit Vitals    /57 (BP 1 Location: Right arm, BP Patient Position: At rest)    Pulse 72    Temp 98.1 °F (36.7 °C)    Resp 20    Ht 5' 3\" (1.6 m)    Wt 40.4 kg (89 lb)    SpO2 96%    BMI 15.77 kg/m2       Physical Exam:  Heart: regular rate and rhythm  Lungs: clear to auscultation bilaterally    Data Review:   Labs:    Recent Results (from the past 24 hour(s))   PLEASE READ & DOCUMENT PPD TEST IN 24 HRS    Collection Time: 06/05/18  5:00 PM   Result Value Ref Range    PPD  Negative    mm Induration  mm    mm Induration  0   CBC WITH AUTOMATED DIFF    Collection Time: 06/06/18  4:48 AM   Result Value Ref Range    WBC 7.5 4.3 - 11.1 K/uL    RBC 3.36 (L) 4.05 - 5.25 M/uL    HGB 10.5 (L) 11.7 - 15.4 g/dL    HCT 32.8 (L) 35.8 - 46.3 %    MCV 97.6 79.6 - 97.8 FL    MCH 31.3 26.1 - 32.9 PG    MCHC 32.0 31.4 - 35.0 g/dL    RDW 15.8 (H) 11.9 - 14.6 %    PLATELET 536 (L) 131 - 450 K/uL    MPV 10.0 (L) 10.8 - 14.1 FL    DF AUTOMATED      NEUTROPHILS 70 43 - 78 %    LYMPHOCYTES 17 13 - 44 %    MONOCYTES 11 4.0 - 12.0 %    EOSINOPHILS 2 0.5 - 7.8 %    BASOPHILS 0 0.0 - 2.0 %    IMMATURE GRANULOCYTES 0 0.0 - 5.0 %    ABS. NEUTROPHILS 5.3 1.7 - 8.2 K/UL    ABS. LYMPHOCYTES 1.3 0.5 - 4.6 K/UL    ABS. MONOCYTES 0.8 0.1 - 1.3 K/UL    ABS. EOSINOPHILS 0.1 0.0 - 0.8 K/UL    ABS. BASOPHILS 0.0 0.0 - 0.2 K/UL    ABS. IMM.  GRANS. 0.0 0.0 - 0.5 K/UL         Assessment:     Patient Active Problem List    Diagnosis Date Noted    Hip fracture ([facility] s/p surgery  04/75/8665    Systolic dysfunction stable post op 79/44/6052    Diastolic dysfunction 26/83/3293    Chronic pain 06/04/2018    Hypokalemia 06/04/2018    Thrombocytopenia ([facility] 06/04/2018    Coronary atherosclerosis of native coronary vessel 01/07/2016    Third degree heart block ([facility] 01/07/2016    Ischemic cardiomyopathy 01/07/2016    Acute kidney failure ([facility]     [facility]) [facility]     AAA (abdominal aortic aneurysm) without rupture ([facility] 08/24/2015    Mitral valve regurgitation 08/23/2015     Class: Acute    Aortic stenosis 08/23/2015     Class: Acute    Lower extremity edema 08/21/2015     Class: Acute    Dehydration 08/21/2015     Class: Acute    Diarrhea 08/21/2015     Class: Acute    N&V (nausea and vomiting) 08/20/2015    Mixed hyperlipidemia 06/16/2015    GERD (gastroesophageal reflux disease)     Lumbago     Osteoarthrosis, unspecified whether generalized or localized, unspecified site     SOB (shortness of breath)     IFG (impaired fasting glucose)     Cervicalgia     Tobacco use disorder     Other malaise and fatigue     Edema        Plan:   Call us if needed Thanks

## 2018-06-06 NOTE — PROGRESS NOTES
Hospitalist Progress Note    2018  Admit Date: 2018 12:55 PM   NAME: Matt Fitzgerald   :  1940   MRN:  [id number]   Attending: hSannon Fischer MD  PCP:  Stuart Trimble MD    SUBJECTIVE:     Matt Fitzgerald is a 10WVV with chronic combined s/d CHF, AAA 5.1cm, COPD on nocturnal O2, 3rd degree heart block s/p PPM, moderate AS, chronic pain on fentanyl patch who was admitted on  with a mechanical fall and resultant R hip fx. She has been seen by cardiology given her extensive medical hx and has been optimized for surgery. Repaired .    : complaining of worsening of her chronic back pain and her post-op hip. Discussed with patient that pain management is something to discuss with ortho. Review of Systems negative with exception of pertinent positives noted above      PHYSICAL EXAM       Visit Vitals    /57 (BP 1 Location: Right arm, BP Patient Position: At rest)    Pulse 72    Temp 98.1 °F (36.7 °C)    Resp 20    Ht 5' 3\" (1.6 m)    Wt 40.4 kg (89 lb)    SpO2 96%    BMI 15.77 kg/m2      Temp (24hrs), Av.1 °F (36.7 °C), Min:97.9 °F (36.6 °C), Max:98.7 °F (37.1 °C)    Oxygen Therapy  O2 Sat (%): 96 % (18 0825)  Pulse via Oximetry: 77 beats per minute (18 1448)  O2 Device: Nasal cannula (18 1550)  O2 Flow Rate (L/min): 3 l/min (18 1130)    Intake/Output Summary (Last 24 hours) at 18 0951  Last data filed at 18 1663   Gross per 24 hour   Intake              250 ml   Output             1325 ml   Net            -1075 ml          General: No acute distress. Head:  Atraumatic Normocephalic. Lungs:  Clear, 2L NC in place   CVS:  RRR  Abdomen: Soft, NTTP  Neurologic:  AOx3.  No focal deficits  Psychiatry:      No anxiety/Depression    Recent Results (from the past 24 hour(s))   PLEASE READ & DOCUMENT PPD TEST IN 24 HRS    Collection Time: 18  5:00 PM   Result Value Ref Range    PPD  Negative    mm Induration  mm    mm Induration  0   CBC WITH AUTOMATED DIFF    Collection Time: 06/06/18  4:48 AM   Result Value Ref Range    WBC 7.5 4.3 - 11.1 K/uL    RBC 3.36 (L) 4.05 - 5.25 M/uL    HGB 10.5 (L) 11.7 - 15.4 g/dL    HCT 32.8 (L) 35.8 - 46.3 %    MCV 97.6 79.6 - 97.8 FL    MCH 31.3 26.1 - 32.9 PG    MCHC 32.0 31.4 - 35.0 g/dL    RDW 15.8 (H) 11.9 - 14.6 %    PLATELET 760 (L) 320 - 450 K/uL    MPV 10.0 (L) 10.8 - 14.1 FL    DF AUTOMATED      NEUTROPHILS 70 43 - 78 %    LYMPHOCYTES 17 13 - 44 %    MONOCYTES 11 4.0 - 12.0 %    EOSINOPHILS 2 0.5 - 7.8 %    BASOPHILS 0 0.0 - 2.0 %    IMMATURE GRANULOCYTES 0 0.0 - 5.0 %    ABS. NEUTROPHILS 5.3 1.7 - 8.2 K/UL    ABS. LYMPHOCYTES 1.3 0.5 - 4.6 K/UL    ABS. MONOCYTES 0.8 0.1 - 1.3 K/UL    ABS. EOSINOPHILS 0.1 0.0 - 0.8 K/UL    ABS. BASOPHILS 0.0 0.0 - 0.2 K/UL    ABS. IMM. GRANS. 0.0 0.0 - 0.5 K/UL         Imaging /Procedures /Studies   XR FEMUR RT 2 VS   Final Result   Impression: Intraoperative fixation as above. XR CHEST SNGL V   Final Result   IMPRESSION: Chronic appearing changes without evidence of active failure. XR FEMUR RT 2 VS   Final Result   IMPRESSION: Proximal femoral fracture. XR HIP RT W OR WO PELV 2-3 VWS   Final Result   IMPRESSION: Fracture right femur with questionable right inferior pubic ramus   fracture. XR WRIST RT AP/LAT/OBL MIN 3V   Final Result   IMPRESSION: Demineralization, very questionable injury to the proximal fourth   metacarpal. If there is point tenderness here, follow-up in 7-10 days should be   considered.       NC XR TECHNOLOGIST SERVICE    (Results Pending)         ASSESSMENT      Hospital Problems as of 6/6/2018  Date Reviewed: 2/23/2016          Codes Class Noted - Resolved POA    * (Principal)Hip fracture ([facility] ICD-10-CM: S88.857G  ICD-9-CM: 820.8  6/4/2018 - Present Yes        Systolic dysfunction (Chronic) ICD-10-CM: I51.9  ICD-9-CM: 429.9  6/4/2018 - Present Yes        Diastolic dysfunction (Chronic) ICD-10-CM: I51.9  ICD-9-CM: 429.9 6/4/2018 - Present Yes        Chronic pain (Chronic) ICD-10-CM: G89.29  ICD-9-CM: 338.29  6/4/2018 - Present Yes        Hypokalemia ICD-10-CM: E87.6  ICD-9-CM: 276.8  6/4/2018 - Present Yes        Thrombocytopenia (HCC) ICD-10-CM: D69.6  ICD-9-CM: 287.5  6/4/2018 - Present Yes        Coronary atherosclerosis of native coronary vessel ICD-10-CM: I25.10  ICD-9-CM: 414.01  1/7/2016 - Present Yes        COPD (chronic obstructive pulmonary disease) (HCC) ICD-10-CM: J44.9  ICD-9-CM: 496  Unknown - Present Yes        AAA (abdominal aortic aneurysm) without rupture (HCC) (Chronic) ICD-10-CM: I71.4  ICD-9-CM: 441.4  8/24/2015 - Present Yes    Overview Signed 10/31/2016  3:21 PM by Ainsley Pisano     surg noted reviewed, high surg risk, PVD limits stent graft options             Aortic stenosis ICD-10-CM: I35.0  ICD-9-CM: 424.1 Acute 8/23/2015 - Present Yes                  Plan:  - R hip fx:  Plan for surgery today  Post-op management per ortho    - chronic systolic/diastolic CHF:  Not in exacerbation  Stable per echo today  Medically optimized per cardiology    - COPD:  Not in exacerbation  Continue O2 at night; may need post-op as well  Will closely monitor resp status after surgery    - Chronic pain:   Holding home fentanyl patch  Post-op pain management per ortho    - Thrombocytopenia:  Stable today  Will continue to monitor throughout hospitalization    - AAA:  Stable  Not candidate for surgical intervention    DVT Prophylaxis: SCDs  Dispo: PT/OT/PPD     Nav Monk MD

## 2018-06-06 NOTE — PROGRESS NOTES
Problem: Falls - Risk of  Goal: *Absence of Falls  Document Mary Alice Fall Risk and appropriate interventions in the flowsheet.    Outcome: Progressing Towards Goal  Fall Risk Interventions:  Mobility Interventions: Bed/chair exit alarm, Patient to call before getting OOB         Medication Interventions: Bed/chair exit alarm, Patient to call before getting OOB    Elimination Interventions: Bed/chair exit alarm, Call light in reach, Patient to call for help with toileting needs    History of Falls Interventions: Bed/chair exit alarm

## 2018-06-06 NOTE — PROGRESS NOTES
Problem: Mobility Impaired (Adult and Pediatric)  Goal: *Acute Goals and Plan of Care (Insert Text)  STG:  (1.)Ms. Shireen Hall will move from supine to sit and sit to supine , scoot up and down and roll side to side with MAXIMAL ASSIST within 3 treatment day(s). (2.)Ms. Shireen Hall will transfer from bed to chair and chair to bed with MAXIMAL ASSIST using the least restrictive device within 3 treatment day(s). (3.)Ms. Shireen Hall will ambulate with MAXIMAL ASSIST for 10 feet with the least restrictive device within 3 treatment day(s). LTG:  (1.)Ms. Shireen Hall will move from supine to sit and sit to supine , scoot up and down and roll side to side in bed with MINIMAL ASSIST within 7 treatment day(s). (2.)Ms. Shireen Hall will transfer from bed to chair and chair to bed with MINIMAL ASSIST using the least restrictive device within 7 treatment day(s). (3.)Ms. Shireen Hall will ambulate with MINIMAL ASSIST for 75 feet with the least restrictive device within 7 treatment day(s). ________________________________________________________________________________________________      PHYSICAL THERAPY: Daily Note, Treatment Day: 1st, PM 6/6/2018  INPATIENT: Hospital Day: 3  Payor: CARE IMPROVEMENT PLUS [place] / Product Type: Managed Care Medicare /    R MIRZA WBAT     NAME/AGE/GENDER: Hunter Kumari is a 69 y.o. female   PRIMARY DIAGNOSIS: Closed intertrochanteric fracture of hip, right, initial encounter ([facility] [S72.141A] Hip fracture ([facility] Hip fracture (HCC)  Procedure(s) (LRB):  RIGHT FEMUR INSERTION INTRA MEDULLARY NAIL / ROOM 726 (Right)  1 Day Post-Op  ICD-10: Treatment Diagnosis:    · Generalized Muscle Weakness (M62.81)  · History of falling (Z91.81)   Precaution/Allergies:  Celebrex [celecoxib]; Codeine; Morphine; and Pravastatin      ASSESSMENT:     .Ms. Shireen Hall presents up in chair with family attending. Pt reports her pain meds are helping very well right now and is not in pain.   Pt stood and was able to ambulate about 6' with rolling walker and mod assist.  Pt requires increased cues for sequencing, R foot placement, walker guidance, and safety awareness. pt returned supine with min assist and additional time. Pt does require increased time with all mobility, however puts forth good effort. Pt with improved steps this afternoon and small increase in distance. pt making progress towards goals. Will continue with POC. This section established at most recent assessment   PROBLEM LIST (Impairments causing functional limitations):  1. Decreased Strength  2. Decreased Transfer Abilities  3. Decreased Ambulation Ability/Technique  4. Decreased Balance  5. Increased Pain  6. Decreased Activity Tolerance   INTERVENTIONS PLANNED: (Benefits and precautions of physical therapy have been discussed with the patient.)  1. Balance Exercise  2. Bed Mobility  3. Family Education  4. Gait Training  5. Therapeutic Activites  6. Therapeutic Exercise/Strengthening  7. Transfer Training  8. Group Therapy     TREATMENT PLAN: Frequency/Duration: twice daily for duration of hospital stay  Rehabilitation Potential For Stated Goals: Good     RECOMMENDED REHABILITATION/EQUIPMENT: (at time of discharge pending progress): Due to the probability of continued deficits (see above) this patient will likely need continued skilled physical therapy after discharge. Equipment:    None at this time              HISTORY:   History of Present Injury/Illness (Reason for Referral):  S/P RIGHT FEMUR INSERTION INTRA MEDULLARY NAIL / HEZB 527 (Right)  Past Medical History/Comorbidities:   Ms. Doc Hart  has a past medical history of AAA (abdominal aortic aneurysm) without rupture ([address] (8/24/2015); Abdominal aneurysm without mention of rupture; Acute kidney failure ([address] Aneurysm ([address] Aortic stenosis (8/23/2015); Arrhythmia; Arthritis; Atherosclerosis of other specified arteries; AV block, 3rd degree ([address] (7/30/2014); CAD (coronary artery disease);  Candidiasis of mouth; Candidiasis of the esophagus; Cervicalgia; Chest pain; Chronic obstructive pulmonary disease ([facility] Chronic pain; Coagulation disorder ([address] COPD (chronic obstructive pulmonary disease) ([address] Coronary atherosclerosis of native coronary artery (6/16/2015); Coronary atherosclerosis of native coronary vessel (1/7/2016); Dehydration (8/21/2015); Diarrhea (8/21/2015); Edema; Generalized and unspecified atherosclerosis; GERD (gastroesophageal reflux disease); Heart failure ([address] Hyperlipidemia; Hypertension, essential, benign; IFG (impaired fasting glucose); Ischemic cardiomyopathy; Lower extremity edema (8/21/2015); Lumbago; Mitral valve regurgitation; Mixed hyperlipidemia (6/16/2015); N&V (nausea and vomiting) (8/20/2015); Osteoarthrosis, unspecified whether generalized or localized, unspecified site; Other malaise and fatigue; PUD (peptic ulcer disease); SOB (shortness of breath); Third degree heart block ([facility] (1/7/2016); Tobacco use disorder; and Tobacco use disorder. She also has no past medical history of Chronic kidney disease or Stroke ([facility] [address] Ms. Sendy Whitney  has a past surgical history that includes pr cardiac surg procedure unlist; hx orthopaedic; hx hysterectomy; and hx pacemaker (7/30/2014). Social History/Living Environment:   Home Environment: Private residence  # Steps to Enter: 3  Rails to Enter: Yes  Hand Rails : Right  One/Two Story Residence: One story  Living Alone: No  Support Systems: Family member(s)  Patient Expects to be Discharged to[de-identified] Unknown  Current DME Used/Available at Home: Wheelchair, Walker, rolling  Tub or Shower Type: Shower  Prior Level of Function/Work/Activity:  Pt lives in a one story house with her family and PTA pt was independent with ADLs. Pt ambulated with SPC PTA. One recent fall reported.        Number of Personal Factors/Comorbidities that affect the Plan of Care: 3+: HIGH COMPLEXITY   EXAMINATION:   Most Recent Physical Functioning:   Gross Assessment: Posture:     Balance:  Sitting - Static: Fair (occasional)  Sitting - Dynamic: Prop sitting  Standing - Static: Fair  Standing - Dynamic : Fair Bed Mobility:  Supine to Sit: Moderate assistance  Wheelchair Mobility:     Transfers:  Sit to Stand: Minimum assistance  Stand to Sit: Minimum assistance  Gait:  Right Side Weight Bearing: As tolerated  Base of Support: Center of gravity altered;Shift to left  Speed/Pam: Shuffled; Slow  Step Length: Left shortened;Right shortened  Gait Abnormalities: Decreased step clearance;Shuffling gait; Step to gait  Distance (ft): 6 Feet (ft)  Assistive Device: Walker, rolling  Ambulation - Level of Assistance: Moderate assistance      Body Structures Involved:  1. Bones  2. Joints  3. Muscles Body Functions Affected:  1. Neuromusculoskeletal  2. Movement Related Activities and Participation Affected:  1. General Tasks and Demands  2. Mobility  3. Self Care  4. Domestic Life  5. Community, Social and [place]   Number of elements that affect the Plan of Care: 4+: HIGH COMPLEXITY   CLINICAL PRESENTATION:   Presentation: Evolving clinical presentation with changing clinical characteristics: MODERATE COMPLEXITY   CLINICAL DECISION MAKIN [facility] Inpatient Short Form  How much difficulty does the patient currently have. .. Unable A Lot A Little None   1. Turning over in bed (including adjusting bedclothes, sheets and blankets)? [] 1   [x] 2   [] 3   [] 4   2. Sitting down on and standing up from a chair with arms ( e.g., wheelchair, bedside commode, etc.)   [x] 1   [] 2   [] 3   [] 4   3. Moving from lying on back to sitting on the side of the bed? [] 1   [x] 2   [] 3   [] 4   How much help from another person does the patient currently need. .. Total A Lot A Little None   4. Moving to and from a bed to a chair (including a wheelchair)? [x] 1   [] 2   [] 3   [] 4   5. Need to walk in hospital room?    [x] 1   [] 2   [] 3 [] 4   6. Climbing 3-5 steps with a railing? [x] 1   [] 2   [] 3   [] 4   © 2007, Trustees of [address] [place] [address] [address] [address], under license to [employer]. All rights reserved      Score:  Initial: 8 Most Recent: X (Date: -- )    Interpretation of Tool:  Represents activities that are increasingly more difficult (i.e. Bed mobility, Transfers, Gait). Score 24 23 22-20 19-15 14-10 9-7 6     Modifier CH CI CJ CK CL CM CN      ? Mobility - Walking and Moving Around:     - CURRENT STATUS: CM - 80%-99% impaired, limited or restricted    - GOAL STATUS: CL - 60%-79% impaired, limited or restricted    - D/C STATUS:  ---------------To be determined---------------  Payor: CARE IMPROVEMENT PLUS [place] / Product Type: [employer] Medicare /      Medical Necessity:     · Patient demonstrates good rehab potential due to higher previous functional level. Reason for Services/Other Comments:  · Patient continues to require skilled intervention due to decreased functional mobility. Use of outcome tool(s) and clinical judgement create a POC that gives a: Questionable prediction of patient's progress: MODERATE COMPLEXITY            TREATMENT:      Pre-treatment Symptoms/Complaints:  Post op pain  Pain: Initial:   Pain Intensity 1: 6  Post Session:  No change reported     Therapeutic Activity: (    18 minutes): Therapeutic activities including Bed transfers, Chair transfers and Ambulation on level ground to improve mobility, strength and balance. Required moderate   to promote static and dynamic balance in standing and promote coordination of bilateral, lower extremity(s). Therapeutic Exercise: (  0 minutes):  Exercises per grid below to improve mobility and strength. Required moderate visual, verbal and manual cues to promote proper body alignment, promote proper body posture and promote proper body mechanics. Progressed range and repetitions as indicated.   Pt requires frequent rest breaks during exercises. Date:  6/6/18 Date:   Date:     ACTIVITY/EXERCISE AM PM AM PM AM PM   Ambulation:           Distance  Device  Duration         Seated Heel Raises X 15 B        Seated Toe Raises X 15 B        Seated Long Arc Quads X 10 B, AA-R        Seated Marching X 10 B, AA-R        Seated Hip Abduction X 10 R AA                 B = bilateral; AA = active assistive; A = active; P = passive        Braces/Orthotics/Lines/Etc:   · IV  · auguste catheter  · O2 Device: Nasal cannula  Treatment/Session Assessment:    · Response to Treatment:  Tolerated better today with bed mobility and ambulation. · Interdisciplinary Collaboration:   o Physical Therapy Assistant  o Registered Nurse  · After treatment position/precautions:   o Supine in bed  o Bed alarm/tab alert on  o Bed/Chair-wheels locked  o Call light within reach  o RN notified  o Family at bedside   · Compliance with Program/Exercises: Will assess as treatment progresses. · Recommendations/Intent for next treatment session: \"Next visit will focus on advancements to more challenging activities and reduction in assistance provided\".   Total Treatment Duration:  PT Patient Time In/Time Out  Time In: 1535  Time Out: [address], PTA

## 2018-06-06 NOTE — PROGRESS NOTES
Problem: Mobility Impaired (Adult and Pediatric)  Goal: *Acute Goals and Plan of Care (Insert Text)  STG:  (1.)Ms. Sheryl Van will move from supine to sit and sit to supine , scoot up and down and roll side to side with MAXIMAL ASSIST within 3 treatment day(s). (2.)Ms. Sheryl Van will transfer from bed to chair and chair to bed with MAXIMAL ASSIST using the least restrictive device within 3 treatment day(s). (3.)Ms. Sheryl Van will ambulate with MAXIMAL ASSIST for 10 feet with the least restrictive device within 3 treatment day(s). LTG:  (1.)Ms. Sheryl Van will move from supine to sit and sit to supine , scoot up and down and roll side to side in bed with MINIMAL ASSIST within 7 treatment day(s). (2.)Ms. Sheryl Van will transfer from bed to chair and chair to bed with MINIMAL ASSIST using the least restrictive device within 7 treatment day(s). (3.)Ms. Sheryl Van will ambulate with MINIMAL ASSIST for 75 feet with the least restrictive device within 7 treatment day(s). ________________________________________________________________________________________________      PHYSICAL THERAPY: Daily Note, Treatment Day: 1st, AM 6/6/2018  INPATIENT: Hospital Day: 3  Payor: CARE IMPROVEMENT PLUS [place] / Product Type: Managed Care Medicare /    R MIRZA WB     NAME/AGE/GENDER: Saul Gonzalez is a 69 y.o. female   PRIMARY DIAGNOSIS: Closed intertrochanteric fracture of hip, right, initial encounter ([facility] [address] [S72.141A] Hip fracture ([facility] [address] Hip fracture (HCC)  Procedure(s) (LRB):  RIGHT FEMUR INSERTION INTRA MEDULLARY NAIL / ROOM 726 (Right)  1 Day Post-Op  ICD-10: Treatment Diagnosis:    · Generalized Muscle Weakness (M62.81)  · History of falling (Z91.81)   Precaution/Allergies:  Celebrex [celecoxib]; Codeine; Morphine; and Pravastatin      ASSESSMENT:     .Ms. Sheryl Van presents supine with granddaughter present and agreed to therapy. Pt requires increased time for all mobility.   Pt performed supine to sit with mod assist and cues.  Pt stood and was able to ambulate about 3' with rolling walker and mod assist.  Pt requires verbal and manual cues for hand placement with sit to stand, sequencing, R foot placement, walker guidance, and safety awareness. Pt was left up in chair with OT present. Pt made progress today with ambulation and bed mobility. Will continue with POC. This section established at most recent assessment   PROBLEM LIST (Impairments causing functional limitations):  1. Decreased Strength  2. Decreased Transfer Abilities  3. Decreased Ambulation Ability/Technique  4. Decreased Balance  5. Increased Pain  6. Decreased Activity Tolerance   INTERVENTIONS PLANNED: (Benefits and precautions of physical therapy have been discussed with the patient.)  1. Balance Exercise  2. Bed Mobility  3. Family Education  4. Gait Training  5. Therapeutic Activites  6. Therapeutic Exercise/Strengthening  7. Transfer Training  8. Group Therapy     TREATMENT PLAN: Frequency/Duration: twice daily for duration of hospital stay  Rehabilitation Potential For Stated Goals: Good     RECOMMENDED REHABILITATION/EQUIPMENT: (at time of discharge pending progress): Due to the probability of continued deficits (see above) this patient will likely need continued skilled physical therapy after discharge. Equipment:    None at this time              HISTORY:   History of Present Injury/Illness (Reason for Referral):  S/P RIGHT FEMUR INSERTION INTRA MEDULLARY NAIL / [id number] 381 (Right)  Past Medical History/Comorbidities:   Ms. Magdalene Joseph  has a past medical history of AAA (abdominal aortic aneurysm) without rupture ([address] (8/24/2015); Abdominal aneurysm without mention of rupture; Acute kidney failure ([address] Aneurysm ([address] Aortic stenosis (8/23/2015); Arrhythmia; Arthritis; Atherosclerosis of other specified arteries; AV block, 3rd degree ([address] (7/30/2014); CAD (coronary artery disease); Candidiasis of mouth; Candidiasis of the esophagus; Cervicalgia;  Chest pain; Chronic obstructive pulmonary disease ([facility] Chronic pain; Coagulation disorder ([facility] [facility] COPD (chronic obstructive pulmonary disease) ([facility] [facility] Coronary atherosclerosis of native coronary artery (6/16/2015); Coronary atherosclerosis of native coronary vessel (1/7/2016); Dehydration (8/21/2015); Diarrhea (8/21/2015); Edema; Generalized and unspecified atherosclerosis; GERD (gastroesophageal reflux disease); Heart failure ([facility] [facility] Hyperlipidemia; Hypertension, essential, benign; IFG (impaired fasting glucose); Ischemic cardiomyopathy; Lower extremity edema (8/21/2015); Lumbago; Mitral valve regurgitation; Mixed hyperlipidemia (6/16/2015); N&V (nausea and vomiting) (8/20/2015); Osteoarthrosis, unspecified whether generalized or localized, unspecified site; Other malaise and fatigue; PUD (peptic ulcer disease); SOB (shortness of breath); Third degree heart block ([facility] (1/7/2016); Tobacco use disorder; and Tobacco use disorder. She also has no past medical history of Chronic kidney disease or Stroke ([facility] [address] Ms. Gwendolyn Sweet  has a past surgical history that includes pr cardiac surg procedure unlist; hx orthopaedic; hx hysterectomy; and hx pacemaker (7/30/2014). Social History/Living Environment:   Home Environment: Private residence  # Steps to Enter: 3  Rails to Enter: Yes  Hand Rails : Right  One/Two Story Residence: One story  Living Alone: No  Support Systems: Family member(s)  Patient Expects to be Discharged to[de-identified] Unknown  Current DME Used/Available at Home: Wheelchair, Walker, rolling  Tub or Shower Type: Shower  Prior Level of Function/Work/Activity:  Pt lives in a one story house with her family and PTA pt was independent with ADLs. Pt ambulated with SPC PTA. One recent fall reported.        Number of Personal Factors/Comorbidities that affect the Plan of Care: 3+: HIGH COMPLEXITY   EXAMINATION:   Most Recent Physical Functioning:   Gross Assessment:                  Posture:     Balance:  Sitting - Static: Fair (occasional)  Sitting - Dynamic: Prop sitting  Standing - Static: Fair  Standing - Dynamic : Fair Bed Mobility:  Supine to Sit: Moderate assistance  Wheelchair Mobility:     Transfers:     Gait:  Right Side Weight Bearing: As tolerated  Base of Support: Center of gravity altered;Shift to left  Speed/Pam: Shuffled; Slow  Step Length: Left shortened;Right shortened  Gait Abnormalities: Decreased step clearance;Shuffling gait; Step to gait  Distance (ft): 3 Feet (ft)  Assistive Device: Walker, rolling  Ambulation - Level of Assistance: Moderate assistance      Body Structures Involved:  1. Bones  2. Joints  3. Muscles Body Functions Affected:  1. Neuromusculoskeletal  2. Movement Related Activities and Participation Affected:  1. General Tasks and Demands  2. Mobility  3. Self Care  4. Domestic Life  5. Community, Social and [place]   Number of elements that affect the Plan of Care: 4+: HIGH COMPLEXITY   CLINICAL PRESENTATION:   Presentation: Evolving clinical presentation with changing clinical characteristics: MODERATE COMPLEXITY   CLINICAL DECISION MAKIN [facility] Inpatient Short Form  How much difficulty does the patient currently have. .. Unable A Lot A Little None   1. Turning over in bed (including adjusting bedclothes, sheets and blankets)? [] 1   [x] 2   [] 3   [] 4   2. Sitting down on and standing up from a chair with arms ( e.g., wheelchair, bedside commode, etc.)   [x] 1   [] 2   [] 3   [] 4   3. Moving from lying on back to sitting on the side of the bed? [] 1   [x] 2   [] 3   [] 4   How much help from another person does the patient currently need. .. Total A Lot A Little None   4. Moving to and from a bed to a chair (including a wheelchair)? [x] 1   [] 2   [] 3   [] 4   5. Need to walk in hospital room? [x] 1   [] 2   [] 3   [] 4   6. Climbing 3-5 steps with a railing?    [x] 1   [] 2   [] 3   [] 4   © 2007, Trustees of [facility], under license to [employer]. All rights reserved      Score:  Initial: 8 Most Recent: X (Date: -- )    Interpretation of Tool:  Represents activities that are increasingly more difficult (i.e. Bed mobility, Transfers, Gait). Score 24 23 22-20 19-15 14-10 9-7 6     Modifier CH CI CJ CK CL CM CN      ? Mobility - Walking and Moving Around:     - CURRENT STATUS: CM - 80%-99% impaired, limited or restricted    - GOAL STATUS: CL - 60%-79% impaired, limited or restricted    - D/C STATUS:  ---------------To be determined---------------  Payor: CARE IMPROVEMENT PLUS [place] / Product Type: [employer] Medicare /      Medical Necessity:     · Patient demonstrates good rehab potential due to higher previous functional level. Reason for Services/Other Comments:  · Patient continues to require skilled intervention due to decreased functional mobility. Use of outcome tool(s) and clinical judgement create a POC that gives a: Questionable prediction of patient's progress: MODERATE COMPLEXITY            TREATMENT:      Pre-treatment Symptoms/Complaints:  Post op pain  Pain: Initial:   Pain Intensity 1: 6  Post Session:  No change reported     Therapeutic Activity: (    10 minutes): Therapeutic activities including Bed transfers, Chair transfers and Ambulation on level ground to improve mobility, strength and balance. Required moderate   to promote static and dynamic balance in standing and promote coordination of bilateral, lower extremity(s). Therapeutic Exercise: (  18 minutes):  Exercises per grid below to improve mobility and strength. Required moderate visual, verbal and manual cues to promote proper body alignment, promote proper body posture and promote proper body mechanics. Progressed range and repetitions as indicated. Pt requires frequent rest breaks during exercises.       Date:  6/6/18 Date:   Date:     ACTIVITY/EXERCISE AM PM AM PM AM PM   Ambulation: Distance  Device  Duration         Seated Heel Raises X 15 B        Seated Toe Raises X 15 B        Seated Long Arc Quads X 10 B, AA-R        Seated Marching X 10 B, AA-R        Seated Hip Abduction X 10 R AA                 B = bilateral; AA = active assistive; A = active; P = passive        Braces/Orthotics/Lines/Etc:   · IV  · auguste catheter  · O2 Device: Nasal cannula  Treatment/Session Assessment:    · Response to Treatment:  Tolerated better today with bed mobility and ambulation. · Interdisciplinary Collaboration:   o Physical Therapy Assistant  o Occupational Therapist  o Registered Nurse  · After treatment position/precautions:   o Up in chair  o Bed alarm/tab alert on  o Bed/Chair-wheels locked  o Call light within reach  o RN notified  o Family at bedside   · Compliance with Program/Exercises: Will assess as treatment progresses. · Recommendations/Intent for next treatment session: \"Next visit will focus on advancements to more challenging activities and reduction in assistance provided\".   Total Treatment Duration:  PT Patient Time In/Time Out  Time In: 1025 (1118)  Time Out: 1035 (1136)    Angeles Jefferson, JACQUELINE

## 2018-06-06 NOTE — PROGRESS NOTES
Problem: Self Care Deficits Care Plan (Adult)  Goal: *Acute Goals and Plan of Care (Insert Text)  1. Patient will maintain WBAT status in RLE for 100% of treatment session and no verbal cues from therapist.   2. Patient will complete functional transfers with supervision while maintaining WBAT status in RLE and with adaptive equipment as needed. 3. Patient will complete lower body bathing and dressing with minimal assistance and adaptive equipment as needed. 4. Patient will complete toileting and toilet transfer with SBA. 5. Patient will tolerate 23 minutes of OT treatment with less than 4 rest breaks to increase activity tolerance for ADLs. 6. Patient will participate in at least 15 minutes of BUE therapeutic exercises to strengthen BUE for functional transfers. Timeframe: 7 visits     Comments:     OCCUPATIONAL THERAPY: Initial Assessment, Daily Note, Treatment Day: Day of Assessment and 1st and AM 6/6/2018  INPATIENT: Hospital Day: 3  Payor: CARE IMPROVEMENT PLUS / [place] / Product Type: [employer] Care Medicare /      NAME/AGE/GENDER: Olivia Robbins is a 69 y.o. female   PRIMARY DIAGNOSIS:  Closed intertrochanteric fracture of hip, right, initial encounter ([facility] [S72.141A] Hip fracture ([facility] [facility] Hip fracture ([facility] [facility]  Procedure(s) (LRB):  RIGHT FEMUR INSERTION INTRA MEDULLARY NAIL / ROOM 726 (Right)  1 Day Post-Op  ICD-10: Treatment Diagnosis:    · Pain in Right Hip (M25.551)  · Stiffness of Right Hip, Not elsewhere classified (M25.651)  · Generalized Muscle Weakness (M62.81)  · Other lack of cordination (R27.8)   Precautions/Allergies:    R WBAT Celebrex [celecoxib]; Codeine; Morphine; and Pravastatin      ASSESSMENT:     Ms. Elieser King presents to hospital for above. PMHx significant for back surgery. Pt lives with grandchildren in a one-level home, where she is typically independent to mod I with ADLs/IADLs, including driving.  Pt uses a cane at baseline for functional mobility; pt endorses no other falls in the last 6 months. Today, pt is found with PTA upon arrival, AOx4, and agreeable to OT evaluation. Per BUE screen, AROM, strength, and coordination are generally decreased, but functional. Pt completes STS with min A x2 and RW and completes functional transfer with min A and RW, demonstrating fair balance in standing. Treatment today consisted of full body bathing while in support sitting at sink. Pt requires moderate assistance for LB bathing. She is able to bathe UB with set up assistance. Pt opted out of zoya care today as she did not want to stand and/or be repositioned again. Pt educated on importance of hygiene and infection prevention, verbalizes understanding. Pt left with possessions in reach and all needs met. Ms. Sheryl Van presents with functional limitations listed below and appears to be functioning below baseline. They will benefit from continued skilled OT services to maximize safety and independence with ADLs. Will follow during acute stay. This section established at most recent assessment   PROBLEM LIST (Impairments causing functional limitations):  1. Decreased Strength  2. Decreased ADL/Functional Activities  3. Decreased Transfer Abilities  4. Decreased Ambulation Ability/Technique  5. Decreased Balance  6. Increased Pain  7. Decreased Activity Tolerance  8. Decreased Flexibility/Joint Mobility  9. Decreased Skin Integrity/Hygeine  10. Decreased [place] with Home Exercise Program   INTERVENTIONS PLANNED: (Benefits and precautions of occupational therapy have been discussed with the patient.)  1. Activities of daily living training  2. Adaptive equipment training  3. Balance training  4. Clothing management  5. Donning&doffing training  6. Group therapy  7. Therapeutic activity  8. Therapeutic exercise     TREATMENT PLAN: Frequency/Duration: Follow patient 6x/week to address above goals.   Rehabilitation Potential For Stated Goals: GOOD     RECOMMENDED REHABILITATION/EQUIPMENT: (at time of discharge pending progress): Due to the probability of continued deficits (see above) this patient will likely need continued skilled occupational therapy after discharge. Equipment:    None at this time              OCCUPATIONAL PROFILE AND HISTORY:   History of Present Injury/Illness (Reason for Referral):  See H&P  Past Medical History/Comorbidities:   Ms. Kevyn Dominguez  has a past medical history of AAA (abdominal aortic aneurysm) without rupture ([address] (8/24/2015); Abdominal aneurysm without mention of rupture; Acute kidney failure ([address] Aneurysm ([address] Aortic stenosis (8/23/2015); Arrhythmia; Arthritis; Atherosclerosis of other specified arteries; AV block, 3rd degree ([address] (7/30/2014); CAD (coronary artery disease); Candidiasis of mouth; Candidiasis of the esophagus; Cervicalgia; Chest pain; Chronic obstructive pulmonary disease ([address] Chronic pain; Coagulation disorder ([address] COPD (chronic obstructive pulmonary disease) ([address] Coronary atherosclerosis of native coronary artery (6/16/2015); Coronary atherosclerosis of native coronary vessel (1/7/2016); Dehydration (8/21/2015); Diarrhea (8/21/2015); Edema; Generalized and unspecified atherosclerosis; GERD (gastroesophageal reflux disease); Heart failure ([address] Hyperlipidemia; Hypertension, essential, benign; IFG (impaired fasting glucose); Ischemic cardiomyopathy; Lower extremity edema (8/21/2015); Lumbago; Mitral valve regurgitation; Mixed hyperlipidemia (6/16/2015); N&V (nausea and vomiting) (8/20/2015); Osteoarthrosis, unspecified whether generalized or localized, unspecified site; Other malaise and fatigue; PUD (peptic ulcer disease); SOB (shortness of breath); Third degree heart block ([address] (1/7/2016); Tobacco use disorder; and Tobacco use disorder. She also has no past medical history of Chronic kidney disease or Stroke ([address]   Ms. Kevyn Dominguez  has a past surgical history that includes pr cardiac surg procedure unlist; hx orthopaedic; hx hysterectomy; and hx pacemaker (7/30/2014). Social History/Living Environment:   Home Environment: Private residence  # Steps to Enter: 3  Rails to Enter: Yes  Hand Rails : Right  One/Two Story Residence: One story  Living Alone: No  Support Systems: Family member(s)  Patient Expects to be Discharged to[de-identified] Unknown  Current DME Used/Available at Home: Wheelchair, Walker, rolling  Tub or Shower Type: Shower  Prior Level of Function/Work/Activity:  [place] to [employer] I with ADLs  Personal Factors:          Sex:  female        Age:  69 y.o. Number of Personal Factors/Comorbidities that affect the Plan of Care: Expanded review of therapy/medical records (1-2):  MODERATE COMPLEXITY   ASSESSMENT OF OCCUPATIONAL PERFORMANCE[de-identified]   Activities of Daily Living:   Basic ADLs (From Assessment) Complex ADLs (From Assessment)   Feeding: Setup  Oral Facial Hygiene/Grooming: Setup  Bathing: Moderate assistance  Upper Body Dressing: Setup  Lower Body Dressing: Moderate assistance  Toileting: Moderate assistance     Grooming/Bathing/Dressing Activities of Daily Living     Cognitive Retraining  Safety/Judgement: Awareness of environment; Fall prevention                       Bed/Mat Mobility  Supine to Sit: Moderate assistance  Sit to Stand: Minimum assistance;Assist x2       Most Recent Physical Functioning:   Gross Assessment:  AROM: Generally decreased, functional  Strength: Generally decreased, functional               Posture:     Balance:  Sitting: Impaired  Sitting - Static: Fair (occasional)  Sitting - Dynamic: Prop sitting  Standing: Impaired  Standing - Static: Fair  Standing - Dynamic : Fair Bed Mobility:  Supine to Sit: Moderate assistance  Wheelchair Mobility:     Transfers:  Sit to Stand: Minimum assistance;Assist x2  Stand to Sit: Minimum assistance            Patient Vitals for the past 6 hrs:   BP BP Patient Position SpO2 Pulse   06/06/18 0758 106/41 At rest 97 % -   06/06/18 0825 106/57 At rest 96 % - 18 1132 115/72 At rest 97 % 74       Mental Status  Neurologic State: Alert  Orientation Level: Oriented X4  Cognition: Follows commands  Perception: Appears intact  Perseveration: No perseveration noted  Safety/Judgement: Awareness of environment, Fall prevention                          Physical Skills Involved:  1. Balance  2. Strength  3. Activity Tolerance  4. Pain (acute)  5. Skin Integrity Cognitive Skills Affected (resulting in the inability to perform in a timely and safe manner):  1. n/a Psychosocial Skills Affected:  1. Habits/Routines  2. Environmental Adaptation  3. Social Interaction  4. Emotional Regulation  5. Social Roles   Number of elements that affect the Plan of Care: 5+:  HIGH COMPLEXITY   CLINICAL DECISION MAKIN[address] [place] [address] AM-PAC 6 Clicks   Daily Activity Inpatient Short Form  How much help from another person does the patient currently need. .. Total A Lot A Little None   1. Putting on and taking off regular lower body clothing? [] 1   [x] 2   [] 3   [] 4   2. Bathing (including washing, rinsing, drying)? [] 1   [x] 2   [] 3   [] 4   3. Toileting, which includes using toilet, bedpan or urinal?   [] 1   [x] 2   [] 3   [] 4   4. Putting on and taking off regular upper body clothing? [] 1   [] 2   [] 3   [x] 4   5. Taking care of personal grooming such as brushing teeth? [] 1   [] 2   [] 3   [x] 4   6. Eating meals? [] 1   [] 2   [] 3   [x] 4   © , Trustees of [address] [place], [place] [address], under license to [employer]. All rights reserved      Score:  Initial: 18 Most Recent: X (Date: -- )    Interpretation of Tool:  Represents activities that are increasingly more difficult (i.e. Bed mobility, Transfers, Gait). Score 24 23 22-20 19-15 14-10 9-7 6     Modifier CH CI CJ CK CL CM CN      ?  Self Care:     - CURRENT STATUS: CK - 40%-59% impaired, limited or restricted    - GOAL STATUS: CJ - 20%-39% impaired, limited or restricted    - D/C STATUS:  ---------------To be determined---------------  Payor: CARE IMPROVEMENT PLUS / [place]PR[place] / Product Type: Managed Care Medicare /      Medical Necessity:     · Patient is expected to demonstrate progress in strength, balance, coordination and functional technique to increase independence with ADLs. Reason for Services/Other Comments:  · Patient continues to require modification of therapeutic interventions to increase complexity of exercises. Use of outcome tool(s) and clinical judgement create a POC that gives a: MODERATE COMPLEXITY         TREATMENT:   (In addition to Assessment/Re-Assessment sessions the following treatments were rendered)     Pre-treatment Symptoms/Complaints:    Pain: Initial:   Pain Intensity 1: 6  Pain Location 1: Hip  Pain Orientation 1: Right  Pain Intervention(s) 1: Ambulation/Increased Activity, Repositioned  Post Session:  Same, RN aware      Self Care: (12 minutes): Procedure(s) (per grid) utilized to improve and/or restore self-care/home management as related to bathing. Required moderate manual and tactile cueing to facilitate activities of daily living skills. Assessment/Reassessment only, no treatment provided today    Braces/Orthotics/Lines/Etc:   · IV  · O2 Device: Nasal cannula  Treatment/Session Assessment:    · Response to Treatment:  Tolerated well w/o complications   · Interdisciplinary Collaboration:   o Physical Therapy Assistant  o Occupational Therapist  o Registered Nurse  o Certified Nursing Assistant/Patient Care Technician  · After treatment position/precautions:   o Up in chair  o Bed alarm/tab alert on  o Bed/Chair-wheels locked  o Call light within reach  o RN notified  o Family at bedside   · Compliance with Program/Exercises: Will assess as treatment progresses. · Recommendations/Intent for next treatment session: \"Next visit will focus on advancements to more challenging activities and reduction in assistance provided\".   Total Treatment Duration:  OT Patient Time In/Time Out  Time In: 1036  Time Out: 11 [facility]

## 2018-06-07 NOTE — PROGRESS NOTES
ORTH FRACTURE PROGRESS NOTE    2018  Admit Date:   2018    Post Op day: 1 Days Post-Op    Subjective:    Sergio Chang PATIENT IN BED; COMFORTABLE     PT/OT:   Gait:  Gait  Base of Support: Center of gravity altered, Narrowed, Shift to left  Speed/Pam: Slow, Shuffled  Step Length: Right shortened, Left shortened  Gait Abnormalities: Decreased step clearance  Ambulation - Level of Assistance: Minimal assistance, Moderate assistance  Distance (ft): 12 Feet (ft)  Assistive Device: Walker, rolling                 Vital Signs:    Patient Vitals for the past 8 hrs:   BP Temp Pulse Resp SpO2   18 1119 103/62 98.2 °F (36.8 °C) 73 18 99 %   18 0727 113/61 98.1 °F (36.7 °C) 89 18 94 %     Temp (24hrs), Av °F (36.7 °C), Min:97.5 °F (36.4 °C), Max:98.2 °F (36.8 °C)      Pain Control:   Pain Assessment  Pain Scale 1: Visual  Pain Intensity 1: 4  Pain Onset 1: postop  Pain Location 1: Leg  Pain Orientation 1: Right  Pain Description 1: Aching  Pain Intervention(s) 1: Medication (see MAR)    Meds:    Current Facility-Administered Medications   Medication Dose Route Frequency    lactated Ringers infusion  75 mL/hr IntraVENous CONTINUOUS    sodium chloride (NS) flush 5-10 mL  5-10 mL IntraVENous Q8H    sodium chloride (NS) flush 5-10 mL  5-10 mL IntraVENous PRN    acetaminophen (TYLENOL) tablet 650 mg  650 mg Oral Q8H    oxyCODONE IR (ROXICODONE) tablet 5 mg  5 mg Oral Q4H PRN    ondansetron (ZOFRAN) injection 4 mg  4 mg IntraVENous Q4H PRN    docusate sodium (COLACE) capsule 100 mg  100 mg Oral BID    alum-mag hydroxide-simeth (MYLANTA) oral suspension 30 mL  30 mL Oral Q4H PRN    calcium-vitamin D (OS-BIBIANA) 500 mg-200 unit tablet  1 Tab Oral TID WITH MEALS    enoxaparin (LOVENOX) injection 30 mg  30 mg SubCUTAneous Q24H    fentaNYL (DURAGESIC) 75 mcg/hr patch 1 Patch  1 Patch TransDERmal Q72H    traMADol (ULTRAM) tablet 50 mg  50 mg Oral Q6H PRN    albuterol-ipratropium (DUO-NEB) 2.5 MG-0.5 MG/3 ML  3 mL Nebulization Q4H PRN    pantoprazole (PROTONIX) tablet 40 mg  40 mg Oral ACB     Current Outpatient Prescriptions   Medication Sig    fentaNYL (DURAGESIC) 75 mcg/hr 1 Patch by TransDERmal route every seventy-two (72) hours. Max Daily Amount: 1 Patch.  calcium-vitamin D (OYSTER SHELL) 500 mg(1,250mg) -200 unit per tablet Take 1 Tab by mouth three (3) times daily (with meals).  enoxaparin (LOVENOX) 30 mg/0.3 mL injection 0.3 mL by SubCUTAneous route every twenty-four (24) hours for 29 days.  oxyCODONE IR (ROXICODONE) 5 mg immediate release tablet Take 1 Tab by mouth every four (4) hours as needed. Max Daily Amount: 30 mg.    metoprolol succinate (TOPROL-XL) 25 mg XL tablet Take 25 mg by mouth daily.  clopidogrel (PLAVIX) 75 mg tab Take 75 mg by mouth daily.  pantoprazole (PROTONIX) 40 mg tablet Take 1 Tab by mouth daily.  furosemide (LASIX) 20 mg tablet Take 1 Tab by mouth daily. Indications: EDEMA    albuterol-ipratropium (DUO-NEB) 2.5 mg-0.5 mg/3 ml nebulizer solution 3 mL by Nebulization route once. LAB:    Recent Labs      06/07/18   0456   06/04/18   1910   HCT  31.0*   < >   --    HGB  9.8*   < >   --    INR   --    --   1.1    < > = values in this interval not displayed. 24 Hour Assessment Issues:    Oriented    Discharge Planning: SNF    Transfuse PRBC's:      Assessment & Physician's Comment:  Dressing is clean, dry, and intact  Neurovascular checks within normal limits    Principal Problem:    Hip fracture ([facility] (6/4/2018)    Active Problems:     Aortic stenosis (8/23/2015)      AAA (abdominal aortic aneurysm) without rupture ([facility] [address] (8/24/2015)      Overview: surg noted reviewed, high surg risk, PVD limits stent graft options      COPD (chronic obstructive pulmonary disease) (HCC) ()      Coronary atherosclerosis of native coronary vessel (4/5/2645)      Systolic dysfunction (6/5/5200)      Diastolic dysfunction (7/7/4084)      Chronic pain (6/4/2018) Hypokalemia (6/4/2018)      Thrombocytopenia ([facility] (6/4/2018)        Plan:  [facility] TO [facility] FOR REHAB      Luis E Webster MD

## 2018-06-07 NOTE — PROGRESS NOTES
Problem: Falls - Risk of  Goal: *Absence of Falls  Document Mary Alice Fall Risk and appropriate interventions in the flowsheet.    Outcome: Progressing Towards Goal  Fall Risk Interventions:  Mobility Interventions: Bed/chair exit alarm, Communicate number of staff needed for ambulation/transfer, Patient to call before getting OOB         Medication Interventions: Assess postural VS orthostatic hypotension, Bed/chair exit alarm, Patient to call before getting OOB    Elimination Interventions: Bed/chair exit alarm, Call light in reach, Patient to call for help with toileting needs    History of Falls Interventions: Bed/chair exit alarm, Door open when patient unattended, Investigate reason for fall

## 2018-06-07 NOTE — DISCHARGE SUMMARY
Hospitalist Discharge Summary     Patient ID:  Curt Hidalgo  [id number]  45 y.o.  1940  Admit date: 6/4/2018 12:55 PM  Discharge date and time: 6/7/2018  Attending: Rebecca Lynch MD  PCP:  David rAevalo MD  Treatment Team: Attending Provider: Rebecca Lynch MD; Consulting Provider: Rylan Cooper MD; Utilization Review: Tiffany Barrow RN; Care Manager: Katherine Ruffin RN; Care Manager: Irina Krishnamurthy RN; Charge Nurse: Kathleen Cr RN; Charge Nurse: Milka Linton    Principal Diagnosis Hip fracture [facility])   Hospital Problems as of 6/7/2018  Date Reviewed: 2/23/2016          Codes Class Noted - Resolved POA    * (Principal)Hip fracture ([facility] ICD-10-CM: S72.009A  ICD-9-CM: 820.8  6/4/2018 - Present Yes        Systolic dysfunction (Chronic) ICD-10-CM: I51.9  ICD-9-CM: 429.9  6/4/2018 - Present Yes        Diastolic dysfunction (Chronic) ICD-10-CM: I51.9  ICD-9-CM: 429.9  6/4/2018 - Present Yes        Chronic pain (Chronic) ICD-10-CM: C71.10  ICD-9-CM: 338.29  6/4/2018 - Present Yes        Hypokalemia ICD-10-CM: E87.6  ICD-9-CM: 276.8  6/4/2018 - Present Yes        Thrombocytopenia ([facility] ICD-10-CM: D69.6  ICD-9-CM: 287.5  6/4/2018 - Present Yes        Coronary atherosclerosis of native coronary vessel ICD-10-CM: I25.10  ICD-9-CM: 414.01  1/7/2016 - Present Yes        COPD (chronic obstructive pulmonary disease) ([facility] ICD-10-CM: J44.9  ICD-9-CM: 903  Unknown - Present Yes        AAA (abdominal aortic aneurysm) without rupture ([facility] (Chronic) ICD-10-CM: I71.4  ICD-9-CM: 441.4  8/24/2015 - Present Yes    Overview Signed 10/31/2016  3:21 PM by Lavern Mendoza     surg noted reviewed, high surg risk, PVD limits stent graft options             Aortic stenosis ICD-10-CM: I35.0  ICD-9-CM: 424.1 Acute 8/23/2015 - Present Yes              HPI: 70 yo female with PMH of ongoing tobacco use, COPD with O2 prn,  third degree AV block with pacer, moderate AS, systolic and diastolic CHF (EF 08% 4438), chronic pain on fentanyl patch, who is s/p fall with right hip pain and found to have right proximal right hip fracture. She has not been compliant with cardiology followup. She denies chest pain and is not very ambulatory to determine if she has dyspnea. She denies troubles with anesthesia. Hospital Course: Admitted on 6/4 with a mechanical fall and resultant R hip fx. Was seen and medically optimized by cardiology prior to surgery given her complex cardiac history. Hip as repaired 6/5. Has chronic pain and main complaint post-op has been pain control. No other acute issues. Will be discharged to [facility] today. Significant Diagnostic Studies:   Labs: Results:       Chemistry Recent Labs      06/05/18   0437  06/04/18   1622  06/04/18   1303   GLU  72   --   118*   NA  146*   --   143   K  4.1   --   3.2*   CL  116*   --   113*   CO2  22   --   23   BUN  14   --   16   CREA  0.88   --   0.94   CA  8.0*  8.5  8.2*   AGAP  8   --   7   AP   --    --   50   TP   --    --   5.9*   ALB   --    --   2.6*   GLOB   --    --   3.3   AGRAT   --    --   0.8*      CBC w/Diff Recent Labs      06/07/18   0456  06/06/18   0448  06/05/18   0437   WBC  6.9  7.5  8.0   RBC  3.18*  3.36*  3.88*   HGB  9.8*  10.5*  12.4   HCT  31.0*  32.8*  38.2   PLT  130*  122*  131*   GRANS  66  70  69   LYMPH  18  17  20   EOS  5  2  0*      Cardiac Enzymes No results for input(s): CPK, CKND1, MAICOL in the last 72 hours. No lab exists for component: CKRMB, TROIP   Coagulation Recent Labs      06/04/18   1910  06/04/18   1303   PTP  13.9  13.5   INR  1.1  1.1   APTT   --   27.0       Lipid Panel Lab Results   Component Value Date/Time    Cholesterol, total 189 11/03/2015 12:15 PM    HDL Cholesterol 55 11/03/2015 12:15 PM    LDL, calculated 101 (H) 11/03/2015 12:15 PM    VLDL, calculated 33 11/03/2015 12:15 PM    Triglyceride 164 (H) 11/03/2015 12:15 PM      BNP No results for input(s): BNPP in the last 72 hours.    Liver Enzymes Recent Labs 06/04/18   1303   TP  5.9*   ALB  2.6*   AP  50   SGOT  16      Thyroid Studies Lab Results   Component Value Date/Time    TSH 1.170 08/21/2015 05:15 AM            Discharge Exam:  Visit Vitals    /61 (BP 1 Location: Right arm, BP Patient Position: At rest)    Pulse 89    Temp 98.1 °F (36.7 °C)    Resp 18    Ht 5' 3\" (1.6 m)    Wt 40.4 kg (89 lb)    SpO2 94%    BMI 15.77 kg/m2     General appearance: alert, NAD, thin  Lungs: clear to auscultation bilaterally  Heart: regular rate and rhythm  Abdomen: soft, NTTP  Neurologic: Grossly normal, A&Ox3    Disposition: STR  Discharge Condition: stable  Patient Instructions:   Current Discharge Medication List      START taking these medications    Details   calcium-vitamin D (OYSTER SHELL) 500 mg(1,250mg) -200 unit per tablet Take 1 Tab by mouth three (3) times daily (with meals). Qty: 90 Tab, Refills: 11      enoxaparin (LOVENOX) 30 mg/0.3 mL injection 0.3 mL by SubCUTAneous route every twenty-four (24) hours for 29 days. Qty: 8.7 mL, Refills: 0      oxyCODONE IR (ROXICODONE) 5 mg immediate release tablet Take 1 Tab by mouth every four (4) hours as needed. Max Daily Amount: 30 mg.  Qty: 15 Tab, Refills: 0    Associated Diagnoses: Closed fracture of right hip, initial encounter ([facility] [address]         CONTINUE these medications which have CHANGED    Details   fentaNYL (DURAGESIC) 75 mcg/hr 1 Patch by TransDERmal route every seventy-two (72) hours. Max Daily Amount: 1 Patch. Qty: 10 Patch, Refills: 0    Associated Diagnoses: Chronic bilateral low back pain without sciatica         CONTINUE these medications which have NOT CHANGED    Details   metoprolol succinate (TOPROL-XL) 25 mg XL tablet Take 25 mg by mouth daily. clopidogrel (PLAVIX) 75 mg tab Take 75 mg by mouth daily. pantoprazole (PROTONIX) 40 mg tablet Take 1 Tab by mouth daily.   Qty: 30 Tab, Refills: 11    Associated Diagnoses: Gastroesophageal reflux disease without esophagitis      furosemide (LASIX) 20 mg tablet Take 1 Tab by mouth daily. Indications: EDEMA  Qty: 10 Tab, Refills: 0    Associated Diagnoses: Localized edema      albuterol-ipratropium (DUO-NEB) 2.5 mg-0.5 mg/3 ml nebulizer solution 3 mL by Nebulization route once.          STOP taking these medications       guaiFENesin ER (MUCINEX) 600 mg ER tablet Comments:   Reason for Stopping:         traMADol (ULTRAM) 50 mg tablet Comments:   Reason for Stopping:               Activity: PT/OT Eval and Treat  Diet: Cardiac Diet  Wound Care: Keep wound clean and dry and Reinforce dressing PRN    Follow-up  -   Ortho as scheduled    Signed:  Nicole Yeh MD  6/7/2018  9:34 AM

## 2018-06-07 NOTE — ROUTINE PROCESS
TRANSFER - OUT REPORT:    Verbal report given to [facility] RN on Omid Burrell  being transferred to \[facility]\"" SERVICES [phone] for routine progression of care       Report consisted of patients Situation, Background, Assessment and   Recommendations(SBAR). Information from the following report(s) SBAR was reviewed with the receiving nurse. Lines:       Opportunity for questions and clarification was provided.       Patient transported with:   Ambulance transport and all belongings

## 2018-06-07 NOTE — PROGRESS NOTES
Patient scheduled for transport today to [facility] room [facility] 3 via stretcher by [employer]. Patient and family notified. Care Management Interventions  PCP Verified by CM:  Yes  Transition of Care Consult (CM Consult): Discharge Planning, SNF  Current Support Network: Own Home (grand daughter and her family lives wtih pt.)  Confirm Follow Up Transport: Family  Plan discussed with Pt/Family/Caregiver: Yes  Freedom of Choice Offered: Yes  Discharge Location  Discharge Placement:  ([facility])

## 2018-06-07 NOTE — PROGRESS NOTES
Problem: Mobility Impaired (Adult and Pediatric)  Goal: *Acute Goals and Plan of Care (Insert Text)  STG:  (1.)Ms. Cara David will move from supine to sit and sit to supine , scoot up and down and roll side to side with MAXIMAL ASSIST within 3 treatment day(s). (2.)Ms. Cara David will transfer from bed to chair and chair to bed with MAXIMAL ASSIST using the least restrictive device within 3 treatment day(s). (3.)Ms. Cara David will ambulate with MAXIMAL ASSIST for 10 feet with the least restrictive device within 3 treatment day(s). LTG:  (1.)Ms. Cara David will move from supine to sit and sit to supine , scoot up and down and roll side to side in bed with MINIMAL ASSIST within 7 treatment day(s). (2.)Ms. Cara David will transfer from bed to chair and chair to bed with MINIMAL ASSIST using the least restrictive device within 7 treatment day(s). (3.)Ms. Cara David will ambulate with MINIMAL ASSIST for 75 feet with the least restrictive device within 7 treatment day(s). ________________________________________________________________________________________________      PHYSICAL THERAPY: Daily Note, Treatment Day: 2nd, AM 6/7/2018  INPATIENT: Hospital Day: 4  Payor: CARE IMPROVEMENT PLUS [place] / Product Type: Managed Care Medicare /    R MIRZA WBAT     NAME/AGE/GENDER: Popeye Zurita is a 69 y.o. female   PRIMARY DIAGNOSIS: Closed intertrochanteric fracture of hip, right, initial encounter ([facility] [address] [S72.141A] Hip fracture ([facility] [address] [facility] Hip fracture (HCC)  Procedure(s) (LRB):  RIGHT FEMUR INSERTION INTRA MEDULLARY NAIL / ROOM 726 (Right)  2 Days Post-Op  ICD-10: Treatment Diagnosis:    · Generalized Muscle Weakness (M62.81)  · History of falling (Z91.81)   Precaution/Allergies:  Celebrex [celecoxib]; Codeine; Morphine; and Pravastatin      ASSESSMENT:     .Ms. Cara David presents supine in bed, agreed to therapy. Pt performed bed mobility with min assist and additional time. Pt with fair+ sitting balance.   Pt then stood and was able to ambulate about 12' using rolling walker and min/mod assist.  Pt does require cues for sequencing, walker guidance and safety. Pt returend to chair and was able to perform below LE exercises. Pt is moving her R LE better this morning, but still requires assist to complete the exercises thru full range. Pt making progress with bed mobility and ambulation. Will continue with POC. This section established at most recent assessment   PROBLEM LIST (Impairments causing functional limitations):  1. Decreased Strength  2. Decreased Transfer Abilities  3. Decreased Ambulation Ability/Technique  4. Decreased Balance  5. Increased Pain  6. Decreased Activity Tolerance   INTERVENTIONS PLANNED: (Benefits and precautions of physical therapy have been discussed with the patient.)  1. Balance Exercise  2. Bed Mobility  3. Family Education  4. Gait Training  5. Therapeutic Activites  6. Therapeutic Exercise/Strengthening  7. Transfer Training  8. Group Therapy     TREATMENT PLAN: Frequency/Duration: twice daily for duration of hospital stay  Rehabilitation Potential For Stated Goals: Good     RECOMMENDED REHABILITATION/EQUIPMENT: (at time of discharge pending progress): Due to the probability of continued deficits (see above) this patient will likely need continued skilled physical therapy after discharge. Equipment:    None at this time              HISTORY:   History of Present Injury/Illness (Reason for Referral):  S/P RIGHT FEMUR INSERTION INTRA MEDULLARY NAIL / MJTA 936 (Right)  Past Medical History/Comorbidities:   Ms. Simeon Contreras  has a past medical history of AAA (abdominal aortic aneurysm) without rupture ([address] (8/24/2015); Abdominal aneurysm without mention of rupture; Acute kidney failure ([address] Aneurysm ([address] Aortic stenosis (8/23/2015); Arrhythmia; Arthritis; Atherosclerosis of other specified arteries; AV block, 3rd degree ([address] (7/30/2014); CAD (coronary artery disease);  Candidiasis of mouth; Candidiasis of the esophagus; Cervicalgia; Chest pain; Chronic obstructive pulmonary disease ([address] Chronic pain; Coagulation disorder ([address] COPD (chronic obstructive pulmonary disease) ([address] Coronary atherosclerosis of native coronary artery (6/16/2015); Coronary atherosclerosis of native coronary vessel (1/7/2016); Dehydration (8/21/2015); Diarrhea (8/21/2015); Edema; Generalized and unspecified atherosclerosis; GERD (gastroesophageal reflux disease); Heart failure ([address] Hyperlipidemia; Hypertension, essential, benign; IFG (impaired fasting glucose); Ischemic cardiomyopathy; Lower extremity edema (8/21/2015); Lumbago; Mitral valve regurgitation; Mixed hyperlipidemia (6/16/2015); N&V (nausea and vomiting) (8/20/2015); Osteoarthrosis, unspecified whether generalized or localized, unspecified site; Other malaise and fatigue; PUD (peptic ulcer disease); SOB (shortness of breath); Third degree heart block ([address] (1/7/2016); Tobacco use disorder; and Tobacco use disorder. She also has no past medical history of Chronic kidney disease or Stroke ([address] Ms. Vini Mathew  has a past surgical history that includes pr cardiac surg procedure unlist; hx orthopaedic; hx hysterectomy; and hx pacemaker (7/30/2014). Social History/Living Environment:   Home Environment: Private residence  # Steps to Enter: 3  Rails to Enter: Yes  Hand Rails : Right  One/Two Story Residence: One story  Living Alone: No  Support Systems: Family member(s)  Patient Expects to be Discharged to[de-identified] Unknown  Current DME Used/Available at Home: Wheelchair, Walker, rolling  Tub or Shower Type: Shower  Prior Level of Function/Work/Activity:  Pt lives in a one story house with her family and PTA pt was independent with ADLs. Pt ambulated with SPC PTA. One recent fall reported.        Number of Personal Factors/Comorbidities that affect the Plan of Care: 3+: HIGH COMPLEXITY   EXAMINATION:   Most Recent Physical Functioning:   Gross Assessment: Posture:     Balance:  Sitting - Static: Fair (occasional) (+)  Sitting - Dynamic: Fair (occasional)  Standing - Static: Fair  Standing - Dynamic : Fair Bed Mobility:  Supine to Sit: Minimum assistance  Wheelchair Mobility:     Transfers:  Sit to Stand: Minimum assistance  Stand to Sit: Minimum assistance  Gait:  Right Side Weight Bearing: As tolerated  Base of Support: Center of gravity altered;Narrowed; Shift to left  Speed/Pam: Slow;Shuffled  Step Length: Right shortened;Left shortened  Gait Abnormalities: Decreased step clearance  Distance (ft): 12 Feet (ft)  Assistive Device: Walker, rolling  Ambulation - Level of Assistance: Minimal assistance; Moderate assistance      Body Structures Involved:  1. Bones  2. Joints  3. Muscles Body Functions Affected:  1. Neuromusculoskeletal  2. Movement Related Activities and Participation Affected:  1. General Tasks and Demands  2. Mobility  3. Self Care  4. Domestic Life  5. Community, Social and [place]   Number of elements that affect the Plan of Care: 4+: HIGH COMPLEXITY   CLINICAL PRESENTATION:   Presentation: Evolving clinical presentation with changing clinical characteristics: MODERATE COMPLEXITY   CLINICAL DECISION MAKIN [facility] In[facility] Short Form  How much difficulty does the patient currently have. .. Unable A Lot A Little None   1. Turning over in bed (including adjusting bedclothes, sheets and blankets)? [] 1   [x] 2   [] 3   [] 4   2. Sitting down on and standing up from a chair with arms ( e.g., wheelchair, bedside commode, etc.)   [x] 1   [] 2   [] 3   [] 4   3. Moving from lying on back to sitting on the side of the bed? [] 1   [x] 2   [] 3   [] 4   How much help from another person does the patient currently need. .. Total A Lot A Little None   4. Moving to and from a bed to a chair (including a wheelchair)? [x] 1   [] 2   [] 3   [] 4   5. Need to walk in hospital room?    [x] 1   [] 2   [] 3   [] 4   6. Climbing 3-5 steps with a railing? [x] 1   [] 2   [] 3   [] 4   © 2007, Trustees of [address] [place] [address] [address] [address], under license to [employer]. All rights reserved      Score:  Initial: 8 Most Recent: X (Date: -- )    Interpretation of Tool:  Represents activities that are increasingly more difficult (i.e. Bed mobility, Transfers, Gait). Score 24 23 22-20 19-15 14-10 9-7 6     Modifier CH CI CJ CK CL CM CN      ? Mobility - Walking and Moving Around:     - CURRENT STATUS: CM - 80%-99% impaired, limited or restricted    - GOAL STATUS: CL - 60%-79% impaired, limited or restricted    - D/C STATUS:  ---------------To be determined---------------  Payor: CARE IMPROVEMENT PLUS [place] / Product Type: [employer] Care Medicare /      Medical Necessity:     · Patient demonstrates good rehab potential due to higher previous functional level. Reason for Services/Other Comments:  · Patient continues to require skilled intervention due to decreased functional mobility. Use of outcome tool(s) and clinical judgement create a POC that gives a: Questionable prediction of patient's progress: MODERATE COMPLEXITY            TREATMENT:      Pre-treatment Symptoms/Complaints:  Post op pain  Pain: Initial:   Pain Intensity 1: 4  Post Session:  No change reported     Therapeutic Activity: (    14 minutes): Therapeutic activities including Bed transfers, Chair transfers and Ambulation on level ground to improve mobility, strength and balance. Required moderate   to promote static and dynamic balance in standing and promote coordination of bilateral, lower extremity(s). Therapeutic Exercise: (  10 minutes):  Exercises per grid below to improve mobility and strength. Required moderate visual, verbal and manual cues to promote proper body alignment, promote proper body posture and promote proper body mechanics. Progressed range and repetitions as indicated.   Pt requires frequent rest breaks during exercises. Date:  6/6/18 Date:  6/7/18 Date:     ACTIVITY/EXERCISE AM PM AM PM AM PM   Ambulation:           Distance  Device  Duration         Seated Heel Raises X 15 B  X 15 B      Seated Toe Raises X 15 B  X 15 B      Seated Long Arc Quads X 10 B, AA-R  X 15 R AA      Seated Marching X 10 B, AA-R  X 15 R AA      Seated Hip Abduction X 10 R AA  X 15 R AA               B = bilateral; AA = active assistive; A = active; P = passive        Braces/Orthotics/Lines/Etc:   · IV  · auguste catheter  · O2 Device: Nasal cannula  Treatment/Session Assessment:    · Response to Treatment:  Improved ambulation and bed mobility noted today. · Interdisciplinary Collaboration:   o Physical Therapy Assistant  o Registered Nurse  · After treatment position/precautions:   o Up in chair  o Bed alarm/tab alert on  o Bed/Chair-wheels locked  o Call light within reach  o RN notified  o Family at bedside   · Compliance with Program/Exercises: Will assess as treatment progresses. · Recommendations/Intent for next treatment session: \"Next visit will focus on advancements to more challenging activities and reduction in assistance provided\".   Total Treatment Duration:  PT Patient Time In/Time Out  Time In: 1016  Time Out: JACQUELINE Mary

## 2018-06-07 NOTE — CONSULTS
Geriatric Fracture Consult  Patient: Na James  YOB: 1940   MRN: [id number]      Consult Date: 6/5/2018     Consulting Physician: DR Jacky Opitz DAVIS=BRODY    Chief Complaint: RIGHT INTERTROCHANTERIC HIP FRACTURE; OSTEOPOROSIS  History of Present Illness: Na James is a 69 y.o.  female who is being seen for right hip pain after sustaining a fall while in her yard. Onset of symptoms was abrupt with unchanged course since that time. The pain is located in the right hip. Patient describes the pain as continuous and rated as moderate. Pain has been associated with movement. Patient denies other injuries. Review of Systems: A comprehensive review of systems was negative except for that written in the History of Present Illness.   ED Presentation Time: < 8 hours  Mechanism of Injury: Fall from standing  Ambulatory Status: Cane  Past Medical History:   Past Medical History:   Diagnosis Date    AAA (abdominal aortic aneurysm) without rupture ([address] 8/24/2015    Abdominal aneurysm without mention of rupture     Acute kidney failure (HCC)     Aneurysm (HCC)     AAA    Aortic stenosis 8/23/2015    Arrhythmia     heart block    Arthritis     Atherosclerosis of other specified arteries     AV block, 3rd degree ([address] 7/30/2014    CAD (coronary artery disease)     8 stents    Candidiasis of mouth     Candidiasis of the esophagus     Cervicalgia     Chest pain     Chronic obstructive pulmonary disease (HCC)     Chronic pain     back    Coagulation disorder (HCC)     GI Bleed    COPD (chronic obstructive pulmonary disease) (HCC)     Coronary atherosclerosis of native coronary artery 6/16/2015    Coronary atherosclerosis of native coronary vessel 1/7/2016    Dehydration 8/21/2015    Diarrhea 8/21/2015    Edema     Generalized and unspecified atherosclerosis     GERD (gastroesophageal reflux disease)     Heart failure (HCC)     Hyperlipidemia     other unsp dyslipidemia    Hypertension, essential, benign     IFG (impaired fasting glucose)     Ischemic cardiomyopathy     Lower extremity edema 8/21/2015    Lumbago     Mitral valve regurgitation     Mixed hyperlipidemia 6/16/2015    N&V (nausea and vomiting) 8/20/2015    Osteoarthrosis, unspecified whether generalized or localized, unspecified site     Other malaise and fatigue     PUD (peptic ulcer disease)     SOB (shortness of breath)     Third degree heart block ([address] 1/7/2016    Tobacco use disorder     Tobacco use disorder        Allergies: Allergies   Allergen Reactions    Celebrex [Celecoxib] Nausea and Vomiting    Codeine Nausea and Vomiting    Morphine Other (comments)     Hallucinations    Pravastatin Other (comments)     Mouth sores       Past Surgical History:   Past Surgical History:   Procedure Laterality Date    CARDIAC SURG PROCEDURE UNLIST      PCIs ; stents x8    HX HYSTERECTOMY      partial    HX ORTHOPAEDIC      back surgeries x 2    HX PACEMAKER  7/30/2014    [facility] pacer      Social History:   Social History     Social History    Marital status:      Spouse name: N/A    Number of children: N/A    Years of education: N/A     Occupational History    Not on file. Social History Main Topics    Smoking status: Current Every Day Smoker     Packs/day: 0.50     Years: 25.00     Last attempt to quit: 10/28/2012    Smokeless tobacco: Never Used      Comment: around 2nd hand smoke    Alcohol use No    Drug use: No    Sexual activity: Not on file     Other Topics Concern    Not on file     Social History Narrative      FAMILY HISTORY - REVIEWED - NO PERTINENT FAMILY HISTORY  Dwelling Status: HOME WITH FAMILY  Current Anticoagulant Medications: Plavix 75 MG/DAY  History of falls: YES  Prior Fractures: DENIES  Osteoporosis Medications: NONE  Bone Density Tests: UNKNOWN  X-RAYS: DENIES  Physical Exam:   PATIENT COMPLAINING OF RIGHT HIP PAIN AFTER A FALL AT HOME.  FOCUSED MUSCULOSKELETAL EXAM REVEALS DECREASED ROM TO RIGHT LE. THERE IS SHORTENING AND EXTERNAL ROTATION NOTED TO RIGHT LE. PATIENT IS TENDER TO PALPATION OVER RIGHT HIP AND GROIN. PATIENT HAS PAIN WITH LOG ROLLING. N/V INTACT. DENIES OTHER INJURIES.     Assessment / Plan: ORIF RIGHT IT FRACTURE WITH IM NAIL; CONSULT PT/OT - WBAT RIGHT LE; STR  RISKS AND BENEFITS WERE ADDRESSED WITH PATIENT AND FAMILY  Labs:    Lab Results   Component Value Date/Time    HGB 9.8 (L) 06/07/2018 04:56 AM    WBC 6.9 06/07/2018 04:56 AM    INR 1.1 06/04/2018 07:10 PM    Albumin 2.6 (L) 06/04/2018 01:03 PM      Preoperative Clearance: YES by Hospitalist and Cardiologist          Signed by: Adina Gibbs NP   Today's Date: June 7, 2018

## 2018-06-07 NOTE — OP NOTES
Operative Report     Patient: Britney Perez MRN: [id number]  SSN: [id number]    YOB: 1940  Age: 69 y.o. Sex: female      Indications: Britney Perez was admitted to the hospital with a brief history of right intertrochanteric hip fracture. The patient now presents for ORIF. Date of Surgery: 6/5/2018     Preoperative Diagnosis:  Closed intertrochanteric fracture of hip, right, initial encounter ([facility] [S72.141A]    Postoperative Diagnosis: Right intertrochanteric femur fracture    Surgeon(s) and Role:     * Juanpablo Hay MD - Primary     Anesthesia: General    Procedures: Procedure(s):  RIGHT FEMUR INSERTION INTRA MEDULLARY NAIL / ROOM 726       Procedure in Detail:  The patient was brought to the operative suite and placed in supine position. After adequate anesthesia was achieved, the patient was placed upon the fracture table. Peroneal post and foot holders were well padded. The patient underwent a closed reduction of the right intertrochanteric hip fracture. This was achieved by longitudinal traction, internal rotation, and adduction. AP and lateral fluoroscopic images confirmed the fracture was now reduced anatomically. right hip was then prepped and draped in the usual sterile fashion. A 3 cm incision was made over the tip of the greater trochanter. Hemostasis was achieved with Bovie cautery. Guide pin for a Synthes trochanteric fixation nail was inserted through the tip of the trochanter, across the fracture site, and into the canal of the femur. Its position was confirmed by fluoroscopy. The proximal reamer was then passed by hand over this guide pin in order to open up a proximal portal.  Guide pin and reamer were removed a ball-tip guide wire was inserted through this portal, across the fracture site, and down to the epiphyseal scar of the knee. The position of the guide wire was confirmed by AP and lateral fluoroscopic images.   The 11.5 mm reamer was passed as a sound, and reaming was only performed by power if necessary. The Synthes trochanteric fixation nail was then passed over the guide wire without difficulty. The guide wire was removed and the targeting guide was inserted through a stab wound in the lateral aspect of the proximal femur. Guide pin was then inserted through the lateral cortex into the neck and head. It stopped just short of the subchondral bone. AP and lateral fluoroscopic images confirmed that the position of the guide pin was centered in the head. Depth gauge was used to determine the appropriate length helical blade. The reamers were passed over the guide pin in order to open up the lateral cortex neck and head. The appropriate length helical blade was then passed over the guide wire without difficulty. The set screw was passed from above with a spring wrench. Traction was removed. The position of the helical blade was confirmed by fluoroscopy. The fracture was then compressed to a stable position, using the proximal targeting guide. At this point, the targeting guides were removed. AP and lateral fluoroscopic images confirmed the fracture was reduced anatomically. Wounds were then irrigated with normal saline and closed in layers. Sterile, compressive dressings were applied. The patient was then removed from the fracture table and transferred to the postanesthesia care unit, alert and oriented, under the care of anesthesia. Estimated Blood Loss: 50 CC    Specimens: * No specimens in log *      Implants:   Implant Name Type Inv.  Item Serial No.  Lot No. LRB No. Used Action   NAIL CHERYL 130D [id number] RT -- [employer] STRL - [id number]  NAIL CHERYL 130D [id number] RT -- [employer] [facility] [id number] Right 1 Implanted   BLADE HELCL FEN 95MM STRL -- [employer] - [id number]   BLADE HELCL FEN 95MM STRL -- TFNA   [employer] [place] [id number] Right 1 Implanted       Tourniquet Time: * No tourniquets in log *     Closure: Primary    Complications: None Signed By: Apolonia Byrne MD     June 7, 2018

## (undated) DEVICE — SUTURE MCRYL SZ 0 L27IN ABSRB VLT CT-1 L36MM 1/2 CIR TAPR Y340H

## (undated) DEVICE — SLIM BODY SKIN STAPLER: Brand: APPOSE ULC

## (undated) DEVICE — OCCLUSIVE GAUZE STRIP,3% BISMUTH TRIBROMOPHENATE IN PETROLATUM BLEND: Brand: XEROFORM

## (undated) DEVICE — X-LARGE COTTON GLOVE: Brand: DEROYAL

## (undated) DEVICE — AMD ANTIMICROBIAL GAUZE SPONGES,12 PLY USP TYPE VII, 0.2% POLYHEXAMETHYLENE BIGUANIDE HCI (PHMB): Brand: CURITY

## (undated) DEVICE — ROD RMR L950MM DIA2.5MM W/ EXTN BALL TIP

## (undated) DEVICE — 1010 S-DRAPE TOWEL DRAPE 10/BX: Brand: STERI-DRAPE™

## (undated) DEVICE — SURGICAL PROCEDURE PACK BASIC ST FRANCIS

## (undated) DEVICE — 3M™ TEGADERM™ TRANSPARENT FILM DRESSING FRAME STYLE, 1628, 6 IN X 8 IN (15 CM X 20 CM), 10/CT 8CT/CASE: Brand: 3M™ TEGADERM™

## (undated) DEVICE — REM POLYHESIVE ADULT PATIENT RETURN ELECTRODE: Brand: VALLEYLAB

## (undated) DEVICE — 2000CC GUARDIAN II: Brand: GUARDIAN

## (undated) DEVICE — 3.2MM GUIDE WIRE 400MM

## (undated) DEVICE — BUTTON SWITCH PENCIL BLADE ELECTRODE, HOLSTER: Brand: EDGE

## (undated) DEVICE — (D)PREP SKN CHLRAPRP APPL 26ML -- CONVERT TO ITEM 371833

## (undated) DEVICE — SOLUTION IV 1000ML 0.9% SOD CHL

## (undated) DEVICE — SUTURE MCRYL SZ 2-0 L27IN ABSRB VLT CT-1 L36MM 1/2 CIR TAPR Y339H

## (undated) DEVICE — INTENDED FOR TISSUE SEPARATION, AND OTHER PROCEDURES THAT REQUIRE A SHARP SURGICAL BLADE TO PUNCTURE OR CUT.: Brand: BARD-PARKER ® STAINLESS STEEL BLADES

## (undated) DEVICE — (D)DRAPE ISOL ANTIMC 129X100IN -- DISC BY MFR